# Patient Record
Sex: FEMALE | Race: WHITE | Employment: OTHER | URBAN - METROPOLITAN AREA
[De-identification: names, ages, dates, MRNs, and addresses within clinical notes are randomized per-mention and may not be internally consistent; named-entity substitution may affect disease eponyms.]

---

## 2018-10-24 RX ORDER — WATER FOR INJECTION,STERILE
VIAL (ML) INJECTION ONCE
Status: DISPENSED | OUTPATIENT
Start: 2018-10-29 | End: 2018-10-30

## 2018-10-29 ENCOUNTER — HOSPITAL ENCOUNTER (OUTPATIENT)
Dept: INFUSION THERAPY | Age: 73
Discharge: HOME | End: 2018-10-29

## 2019-04-04 ENCOUNTER — HOSPITAL ENCOUNTER (OUTPATIENT)
Dept: INFUSION THERAPY | Age: 74
Discharge: HOME | End: 2019-04-04
Payer: MEDICARE

## 2019-04-04 VITALS
TEMPERATURE: 97.8 F | OXYGEN SATURATION: 93 % | HEART RATE: 87 BPM | SYSTOLIC BLOOD PRESSURE: 103 MMHG | RESPIRATION RATE: 18 BRPM | DIASTOLIC BLOOD PRESSURE: 59 MMHG

## 2019-04-04 PROCEDURE — 96372 THER/PROPH/DIAG INJ SC/IM: CPT

## 2019-04-04 PROCEDURE — 74011250636 HC RX REV CODE- 250/636: Performed by: INTERNAL MEDICINE

## 2019-04-04 RX ORDER — LEVOTHYROXINE SODIUM 25 UG/1
50 TABLET ORAL
COMMUNITY

## 2019-04-04 RX ORDER — ACETAMINOPHEN 500 MG
500 TABLET ORAL
COMMUNITY

## 2019-04-04 RX ORDER — METOPROLOL SUCCINATE 25 MG/1
12.5 TABLET, EXTENDED RELEASE ORAL DAILY
COMMUNITY

## 2019-04-04 RX ORDER — OLMESARTAN MEDOXOMIL AND HYDROCHLOROTHIAZIDE 20/12.5 20; 12.5 MG/1; MG/1
1 TABLET ORAL DAILY
COMMUNITY

## 2019-04-04 RX ORDER — ASPIRIN 81 MG/1
81 TABLET ORAL DAILY
COMMUNITY

## 2019-04-04 RX ORDER — PREDNISONE 10 MG/1
10 TABLET ORAL AS NEEDED
COMMUNITY

## 2019-04-04 RX ORDER — MONTELUKAST SODIUM 10 MG/1
10 TABLET ORAL DAILY
COMMUNITY

## 2019-04-04 RX ORDER — ALBUTEROL SULFATE 0.83 MG/ML
2.5 SOLUTION RESPIRATORY (INHALATION) ONCE
COMMUNITY

## 2019-04-04 RX ORDER — FLUTICASONE FUROATE AND VILANTEROL 200; 25 UG/1; UG/1
1 POWDER RESPIRATORY (INHALATION) DAILY
COMMUNITY
End: 2019-06-06

## 2019-04-04 RX ORDER — HYDROCODONE BITARTRATE AND ACETAMINOPHEN 5; 325 MG/1; MG/1
1 TABLET ORAL AS NEEDED
COMMUNITY

## 2019-04-04 RX ADMIN — MEPOLIZUMAB 100 MG: 100 INJECTION, POWDER, FOR SOLUTION SUBCUTANEOUS at 13:32

## 2019-04-04 NOTE — PROGRESS NOTES
[facility] Progress Note Date: 2019 Name: Griselda Heather MRN: [id number] : 1945 Nucala Injection. Ms. Pilar Treadwell was assessed and education was provided. Ms. Ritter's vitals were reviewed and patient was observed for 5 minutes prior to treatment. Visit Vitals /59 (BP 1 Location: Right arm, BP Patient Position: Sitting) Pulse 87 Temp 97.8 °F (36.6 °C) Resp 18 SpO2 93% Breastfeeding? No  
 
 
 
Nucala 100 mg  was administered subcutaneously to the back of patient's left arm. Band aid applied to site. Nucala care notes given to patient. Full understanding noted. Opportunities for questions provided. Patient was observed for 30 minutes and found to have no allergic reaction. Ms. Pilar Treadwell tolerated well, and had no complaints. Patient armband removed and shredded. Ms. Pilar Treadwell was discharged from [place] [address] in stable condition at 1400. She is to return on 19 at 1330 for her next appointment. Rodney Kincaid 2019 
1:45 PM

## 2019-05-02 ENCOUNTER — HOSPITAL ENCOUNTER (OUTPATIENT)
Dept: INFUSION THERAPY | Age: 74
Discharge: HOME | End: 2019-05-02
Payer: MEDICARE

## 2019-05-02 VITALS
TEMPERATURE: 98 F | RESPIRATION RATE: 18 BRPM | SYSTOLIC BLOOD PRESSURE: 119 MMHG | OXYGEN SATURATION: 95 % | DIASTOLIC BLOOD PRESSURE: 70 MMHG | HEART RATE: 82 BPM

## 2019-05-02 PROCEDURE — 74011250636 HC RX REV CODE- 250/636: Performed by: INTERNAL MEDICINE

## 2019-05-02 PROCEDURE — 96372 THER/PROPH/DIAG INJ SC/IM: CPT

## 2019-05-02 RX ADMIN — MEPOLIZUMAB 100 MG: 100 INJECTION, POWDER, FOR SOLUTION SUBCUTANEOUS at 13:38

## 2019-05-02 NOTE — PROGRESS NOTES
[facility] Progress Note Date: May 2, 2019 Name: Marian Arrieta MRN: [id number] : 1945 Nucala 100 mg injection every 28 days Ms. Ritter was assessed and education was provided. Denies any problems with previous injection. Ms. Ritter's vitals were reviewed and patient was observed for 5 minutes prior to treatment. Visit Vitals /70 (BP 1 Location: Left arm, BP Patient Position: Sitting) Pulse 82 Temp 98 °F (36.7 °C) Resp 18 SpO2 95% Breastfeeding? No  
 
Patient Vitals for the past 12 hrs: 
 Temp Pulse Resp BP SpO2  
19 1410  82 18 119/70   
19 1329 98 °F (36.7 °C) 88 18 94/62 95 % Nucala 100 mg/1 ml was administered subcutaneously in back of upper left arm. No irritation or drainage noted from site, band aid applied. Observed for 20 minutes, no s/s reaction noted. . 
  
 
Ms. Ritter tolerated well, and had no complaints. Patient armband removed and shredded. Ms. Nivia Raza was discharged from [place] [address] in stable condition at 1410. She is to return on 2019 at 1400 for her next appointment for Nucala injeciton. Rolando Grady RN May 2, 2019 
3:04 PM

## 2019-06-06 ENCOUNTER — HOSPITAL ENCOUNTER (OUTPATIENT)
Dept: INFUSION THERAPY | Age: 74
Discharge: HOME | End: 2019-06-06
Payer: MEDICARE

## 2019-06-06 VITALS
TEMPERATURE: 98.4 F | HEART RATE: 88 BPM | RESPIRATION RATE: 18 BRPM | OXYGEN SATURATION: 97 % | SYSTOLIC BLOOD PRESSURE: 118 MMHG | DIASTOLIC BLOOD PRESSURE: 70 MMHG

## 2019-06-06 PROCEDURE — 96372 THER/PROPH/DIAG INJ SC/IM: CPT

## 2019-06-06 PROCEDURE — 74011250636 HC RX REV CODE- 250/636: Performed by: INTERNAL MEDICINE

## 2019-06-06 RX ADMIN — MEPOLIZUMAB 100 MG: 100 INJECTION, POWDER, FOR SOLUTION SUBCUTANEOUS at 14:27

## 2019-06-06 NOTE — PROGRESS NOTES
[facility] Progress Note    Date: 2019    Name: Rosario Dyer    MRN: [id number]         : 1945      Nucala 100 mg injection every 28 days      Ms. Ritter was assessed and education was provided. Denies any problems with previous injection. Ms. Ritter's vitals were reviewed and patient was observed for 5 minutes prior to treatment. Visit Vitals  /70 (BP 1 Location: Left arm, BP Patient Position: Sitting)   Pulse 88   Temp 98.4 °F (36.9 °C)   Resp 18   SpO2 97%   Breastfeeding? No     Patient Vitals for the past 12 hrs:   Temp Pulse Resp BP SpO2   19 1458 98.4 °F (36.9 °C) 88 18 118/70 97 %   19 1421 98.5 °F (36.9 °C) 88 18 116/68 98 %       Nucala 100 mg/1 ml was administered subcutaneously in back of upper left arm. No irritation or drainage noted from site, band aid applied. Observed for 30 minutes, no s/s reaction noted. .       Ms. Ritter tolerated well, and had no complaints. Patient armband removed and shredded. Ms. Jaky Ruano was discharged from [place] [address] in stable condition at 1500. She is to return on 2019 at 1400 for her next appointment for Nucala injeciton.     Darlene Stone RN  2019  3:04 PM

## 2019-07-09 ENCOUNTER — HOSPITAL ENCOUNTER (OUTPATIENT)
Dept: INFUSION THERAPY | Age: 74
Discharge: HOME | End: 2019-07-09
Payer: MEDICARE

## 2019-07-09 VITALS
OXYGEN SATURATION: 95 % | RESPIRATION RATE: 18 BRPM | DIASTOLIC BLOOD PRESSURE: 60 MMHG | SYSTOLIC BLOOD PRESSURE: 108 MMHG | TEMPERATURE: 98.3 F | HEART RATE: 80 BPM

## 2019-07-09 PROCEDURE — 74011250636 HC RX REV CODE- 250/636: Performed by: INTERNAL MEDICINE

## 2019-07-09 PROCEDURE — 96372 THER/PROPH/DIAG INJ SC/IM: CPT

## 2019-07-09 RX ADMIN — MEPOLIZUMAB 100 MG: 100 INJECTION, POWDER, FOR SOLUTION SUBCUTANEOUS at 14:53

## 2019-07-09 NOTE — PROGRESS NOTES
[facility] Progress Note    Date: 2019    Name: Edwar Mcfarlane    MRN: [id number]         : 1945      Ms. Ritter arrived to [facility] at 1425. Ms. Sandy Gonzalez was assessed and education was provided. Ms. Ritter's vitals were reviewed. Visit Vitals  /60 (BP 1 Location: Right arm, BP Patient Position: Sitting)   Pulse 80   Temp 98.3 °F (36.8 °C)   Resp 18   SpO2 95%   Breastfeeding? No       Nucala 100mg was administered as ordered SQ in patient's left upper arm. Pt stayed for 40 minutes after her injection for observation (per pt request). Ms. Sandy Gonzalez tolerated well without complaints. Ms. Sandy Gonzalez was discharged from [place] [address] in stable condition at 32 [phone] 16. She is to return on 19 at 1400 for her next appointment.     Tha Enamorado RN  2019

## 2019-08-06 ENCOUNTER — HOSPITAL ENCOUNTER (OUTPATIENT)
Dept: INFUSION THERAPY | Age: 74
Discharge: HOME | End: 2019-08-06
Payer: MEDICARE

## 2019-08-06 VITALS
DIASTOLIC BLOOD PRESSURE: 60 MMHG | TEMPERATURE: 98 F | SYSTOLIC BLOOD PRESSURE: 104 MMHG | OXYGEN SATURATION: 94 % | HEART RATE: 82 BPM | RESPIRATION RATE: 18 BRPM

## 2019-08-06 PROCEDURE — 74011250636 HC RX REV CODE- 250/636: Performed by: INTERNAL MEDICINE

## 2019-08-06 PROCEDURE — 96372 THER/PROPH/DIAG INJ SC/IM: CPT

## 2019-08-06 RX ADMIN — MEPOLIZUMAB 100 MG: 100 INJECTION, POWDER, FOR SOLUTION SUBCUTANEOUS at 14:00

## 2019-08-06 NOTE — PROGRESS NOTES
[facility] Progress Note    Date: 2019    Name: Tereza Hughes    MRN: [id number]         : 1945      Nucala 100 mg injection every 28 days      Ms. Ritter was assessed and education was provided. Denies any problems with previous injection. Ms. Ritter's vitals were reviewed and patient was observed for 5 minutes prior to treatment. Visit Vitals  /60 (BP 1 Location: Right arm, BP Patient Position: Sitting)   Pulse 82   Temp 98 °F (36.7 °C)   Resp 18   SpO2 94%   Breastfeeding? No     Patient Vitals for the past 12 hrs:   Temp Pulse Resp BP SpO2   19 1445 98 °F (36.7 °C) 82 18 104/60 94 %   19 1401 97.3 °F (36.3 °C) 87 18 118/72 94 %       Nucala 100 mg/1 ml was administered subcutaneously in back of upper left arm. No irritation or drainage noted from site, band aid applied. Observed for 30 minutes, no s/s reaction noted. .       Ms. Ritter tolerated well, and had no complaints. Patient armband removed and shredded. Ms. Marvin Martinez was discharged from [place] [address] in stable condition at [phone]. She is to return on 2019 at 1400 for her next appointment for Nucala injeciton.     Leidy Yeung RN  2019  3:04 PM

## 2019-09-12 ENCOUNTER — HOSPITAL ENCOUNTER (OUTPATIENT)
Dept: INFUSION THERAPY | Age: 74
Discharge: HOME | End: 2019-09-12
Payer: MEDICARE

## 2019-09-12 VITALS
TEMPERATURE: 98.4 F | OXYGEN SATURATION: 95 % | DIASTOLIC BLOOD PRESSURE: 70 MMHG | HEART RATE: 82 BPM | SYSTOLIC BLOOD PRESSURE: 127 MMHG | RESPIRATION RATE: 18 BRPM

## 2019-09-12 PROCEDURE — 74011250636 HC RX REV CODE- 250/636: Performed by: INTERNAL MEDICINE

## 2019-09-12 PROCEDURE — 96372 THER/PROPH/DIAG INJ SC/IM: CPT

## 2019-09-12 RX ORDER — AA/PROT/LYSINE/METHIO/VIT C/B6 50-12.5 MG
TABLET ORAL DAILY
COMMUNITY

## 2019-09-12 RX ORDER — ATORVASTATIN CALCIUM 40 MG/1
40 TABLET, FILM COATED ORAL DAILY
COMMUNITY

## 2019-09-12 RX ORDER — NITROGLYCERIN 0.4 MG/1
0.4 TABLET SUBLINGUAL
COMMUNITY

## 2019-09-12 RX ADMIN — MEPOLIZUMAB 100 MG: 100 INJECTION, POWDER, FOR SOLUTION SUBCUTANEOUS at 14:15

## 2019-09-12 NOTE — PROGRESS NOTES
[facility] Short Consult Note                  (Labs/Type & Cross/Portflush/Antibiotic/Non-Chemo injections)      Date: 2019    Name: Najma Montague    MRN: [id number]         : 1945    Treatment: Anayeli Bacca Injection      Ms. Ritter was assessed and education was provided. Ms. Dejuan Cuba stated she has had a heart attack since last visit on 8/10/19 and had cardiac stents placed at [facility]. Ms. Ritter's vitals were reviewed and patient was observed for 5 minutes prior to treatment. Visit Vitals  /70 (BP 1 Location: Left arm, BP Patient Position: Sitting)   Pulse 82   Temp 98.4 °F (36.9 °C)   Resp 18   SpO2 95%   Breastfeeding? No     Nucala 100 mg SQ given in patient's Left upper arm and band aid applied to site. Ms. Dejuan Cuba tolerated the injection, and had no complaints. Ms. Dejuan Cuba was discharged from [place] [address] in stable condition at 1450. She is to return on 10/10/19 at 1400 for her next appointment. Armband removed and shredded.      Kelly Crooks RN  2019  2:23 PM

## 2019-10-08 ENCOUNTER — APPOINTMENT (OUTPATIENT)
Dept: INFUSION THERAPY | Age: 74
End: 2019-10-08
Payer: MEDICARE

## 2019-10-10 ENCOUNTER — HOSPITAL ENCOUNTER (OUTPATIENT)
Dept: INFUSION THERAPY | Age: 74
Discharge: HOME | End: 2019-10-10
Payer: MEDICARE

## 2019-10-10 VITALS
DIASTOLIC BLOOD PRESSURE: 62 MMHG | HEART RATE: 84 BPM | RESPIRATION RATE: 18 BRPM | TEMPERATURE: 98.3 F | OXYGEN SATURATION: 96 % | SYSTOLIC BLOOD PRESSURE: 105 MMHG

## 2019-10-10 PROCEDURE — 74011250636 HC RX REV CODE- 250/636: Performed by: INTERNAL MEDICINE

## 2019-10-10 PROCEDURE — 96372 THER/PROPH/DIAG INJ SC/IM: CPT

## 2019-10-10 RX ADMIN — MEPOLIZUMAB 100 MG: 100 INJECTION, POWDER, FOR SOLUTION SUBCUTANEOUS at 14:44

## 2019-10-10 NOTE — PROGRESS NOTES
[facility] Short Consult Note                                              (Non-Chemo injections)      Date: October 10, 2019    Name: Carlon Olszewski    MRN: [id number]         : 1945    Treatment: Masha Katos Injection      Ms. Ritter arrived ambulatory to [facility] by self @ 5812. Ms. Ritter's vitals were reviewed and patient was observed for 5 minutes prior to treatment. Visit Vitals  /62 (BP 1 Location: Left arm, BP Patient Position: Sitting)   Pulse 84   Temp 98.3 °F (36.8 °C)   Resp 18   SpO2 96%   Breastfeeding? No     Nucala 100 mg SQ given in patient's Left upper arm and band aid applied to site. Ms. Maura Avila tolerated the injection, and had no complaints. Ms. Maura Avila was discharged from [place] [address] in stable condition at 1500. She is to return on 19 at 1400 for her next appointment. Armband removed and shredded.      Sussy Saucedo RN  October 10, 2019  2:23 PM

## 2019-10-30 PROBLEM — J82.83 EOSINOPHILIC ASTHMA: Status: ACTIVE | Noted: 2019-10-30

## 2019-11-07 DIAGNOSIS — J82.83 EOSINOPHILIC ASTHMA: ICD-10-CM

## 2019-11-14 ENCOUNTER — HOSPITAL ENCOUNTER (OUTPATIENT)
Dept: INFUSION THERAPY | Age: 74
Discharge: HOME | End: 2019-11-14
Attending: INTERNAL MEDICINE
Payer: MEDICARE

## 2019-11-14 VITALS
SYSTOLIC BLOOD PRESSURE: 112 MMHG | HEART RATE: 76 BPM | TEMPERATURE: 97 F | RESPIRATION RATE: 18 BRPM | DIASTOLIC BLOOD PRESSURE: 73 MMHG | OXYGEN SATURATION: 99 %

## 2019-11-14 DIAGNOSIS — J82.83 EOSINOPHILIC ASTHMA: ICD-10-CM

## 2019-11-14 DIAGNOSIS — J82.83 EOSINOPHILIC ASTHMA: Primary | ICD-10-CM

## 2019-11-14 PROCEDURE — 74011250636 HC RX REV CODE- 250/636: Performed by: INTERNAL MEDICINE

## 2019-11-14 PROCEDURE — 96372 THER/PROPH/DIAG INJ SC/IM: CPT

## 2019-11-14 RX ADMIN — MEPOLIZUMAB 100 MG: 100 INJECTION, POWDER, FOR SOLUTION SUBCUTANEOUS at 14:15

## 2019-11-14 NOTE — PROGRESS NOTES
[facility] Short Consult Note                  (Labs/Type & Cross/Portflush/Antibiotic/Non-Chemo injections)      Date: 2019    Name: Nunu Hernandez    MRN: [id number]         : 1945    Treatment: Aldean Meuse Injection      Ms. Ritter was assessed and education was provided. Ms. Ritter's vitals were reviewed and patient was observed for 5 minutes prior to treatment. Visit Vitals  /73 (BP 1 Location: Left arm, BP Patient Position: Sitting)   Pulse 76   Temp 97 °F (36.1 °C)   Resp 18   SpO2 99%   Breastfeeding? No       Nucala 100 mg SQ given in patient's Left upper arm and band aid applied to site.     Ms. Ritter tolerated the injection, and had no complaints. Patient prefers to wait in clinic for observation post injection. Patient observed for approximately 45 minutes post injection. No s/s of complications noted. Ms. Oconnell Seen was discharged from [place] [address] in stable condition at 1500. She is to return on 19 at 1400 for her next appointment.     Rozina Moody RN  2019  2:38 PM

## 2019-12-05 DIAGNOSIS — J82.83 EOSINOPHILIC ASTHMA: ICD-10-CM

## 2019-12-12 ENCOUNTER — HOSPITAL ENCOUNTER (OUTPATIENT)
Dept: INFUSION THERAPY | Age: 74
Discharge: HOME | End: 2019-12-12
Attending: INTERNAL MEDICINE
Payer: MEDICARE

## 2019-12-12 VITALS
TEMPERATURE: 97.6 F | SYSTOLIC BLOOD PRESSURE: 108 MMHG | OXYGEN SATURATION: 97 % | HEART RATE: 71 BPM | RESPIRATION RATE: 16 BRPM | DIASTOLIC BLOOD PRESSURE: 63 MMHG

## 2019-12-12 DIAGNOSIS — J82.83 EOSINOPHILIC ASTHMA: Primary | ICD-10-CM

## 2019-12-12 PROCEDURE — 74011250636 HC RX REV CODE- 250/636: Performed by: INTERNAL MEDICINE

## 2019-12-12 PROCEDURE — 96372 THER/PROPH/DIAG INJ SC/IM: CPT

## 2019-12-12 RX ADMIN — MEPOLIZUMAB 100 MG: 100 INJECTION, POWDER, FOR SOLUTION SUBCUTANEOUS at 14:30

## 2019-12-12 NOTE — PROGRESS NOTES
[facility] Short Consult Note                  (Labs/Type & Cross/Portflush/Antibiotic/Non-Chemo injections)      Date: 2019    Name: Irwin Galaviz    MRN: [id number]         : 1945    Treatment: Louana Ano Injection      Ms. Ritter was assessed and education was provided. Ms. Ritter's vitals were reviewed and patient was observed for 5 minutes prior to treatment. Visit Vitals  /63 (BP 1 Location: Left arm, BP Patient Position: Sitting)   Pulse 71   Temp 97.6 °F (36.4 °C)   Resp 16   SpO2 97%       Nucala 100 mg SQ given in patient's Left upper arm and band aid applied to site.     Ms. Ritter tolerated the injection, and had no complaints. Patient prefers to wait in clinic for observation post injection. Patient observed for approximately 45 minutes post injection. No s/s of complications noted. Ms. Shira Villanueva was discharged from [place] [address] in stable condition at 1515. She is to return on 2020 at 1400 for her next appointment.     Rocael Corcoran RN  2019  2:38 PM

## 2020-01-02 DIAGNOSIS — J82.83 EOSINOPHILIC ASTHMA: ICD-10-CM

## 2020-01-09 ENCOUNTER — HOSPITAL ENCOUNTER (OUTPATIENT)
Dept: INFUSION THERAPY | Age: 75
Discharge: HOME | End: 2020-01-09
Attending: INTERNAL MEDICINE
Payer: MEDICARE

## 2020-01-09 VITALS
DIASTOLIC BLOOD PRESSURE: 62 MMHG | OXYGEN SATURATION: 96 % | RESPIRATION RATE: 16 BRPM | SYSTOLIC BLOOD PRESSURE: 115 MMHG | TEMPERATURE: 99 F | HEART RATE: 78 BPM

## 2020-01-09 DIAGNOSIS — J82.83 EOSINOPHILIC ASTHMA: Primary | ICD-10-CM

## 2020-01-09 PROCEDURE — 96372 THER/PROPH/DIAG INJ SC/IM: CPT

## 2020-01-09 PROCEDURE — 74011250636 HC RX REV CODE- 250/636: Performed by: INTERNAL MEDICINE

## 2020-01-09 RX ADMIN — MEPOLIZUMAB 100 MG: 100 INJECTION, POWDER, FOR SOLUTION SUBCUTANEOUS at 14:32

## 2020-01-09 NOTE — PROGRESS NOTES
[facility] Short Consult Note                  (Labs/Type & Cross/Portflush/Antibiotic/Non-Chemo injections)      Date: 2020    Name: Mesha Alcaraz    MRN: [id number]         : 1945    Treatment: Keya Ivanoff Injection      Ms. Ritter was assessed and education was provided. Ms. Ritter's vitals were reviewed and patient was observed for 5 minutes prior to treatment. Visit Vitals  /62 (BP 1 Location: Left arm, BP Patient Position: Sitting)   Pulse 78   Temp 99 °F (37.2 °C)   Resp 16   SpO2 96%       Nucala 100 mg SQ given in patient's Left upper arm and band aid applied to site.     Ms. Ritter tolerated the injection, and had no complaints. Patient prefers to wait in clinic for observation post injection. Patient observed for approximately 45 minutes post injection. No s/s of complications noted. Ms. Yessy Romero was discharged from [place] [address] in stable condition at 1515. She is to return on 2020 at 1400 for her next appointment.     [facility], RN  2020  2:38 PM

## 2020-01-30 DIAGNOSIS — J82.83 EOSINOPHILIC ASTHMA: ICD-10-CM

## 2020-02-10 ENCOUNTER — HOSPITAL ENCOUNTER (OUTPATIENT)
Dept: INFUSION THERAPY | Age: 75
Discharge: HOME | End: 2020-02-10
Attending: INTERNAL MEDICINE
Payer: MEDICARE

## 2020-02-10 VITALS
TEMPERATURE: 98 F | DIASTOLIC BLOOD PRESSURE: 69 MMHG | OXYGEN SATURATION: 97 % | HEART RATE: 76 BPM | SYSTOLIC BLOOD PRESSURE: 116 MMHG | RESPIRATION RATE: 16 BRPM

## 2020-02-10 DIAGNOSIS — J82.83 EOSINOPHILIC ASTHMA: Primary | ICD-10-CM

## 2020-02-10 PROCEDURE — 74011250636 HC RX REV CODE- 250/636: Performed by: INTERNAL MEDICINE

## 2020-02-10 PROCEDURE — 96372 THER/PROPH/DIAG INJ SC/IM: CPT

## 2020-02-10 RX ADMIN — MEPOLIZUMAB 100 MG: 100 INJECTION, POWDER, FOR SOLUTION SUBCUTANEOUS at 14:27

## 2020-02-10 NOTE — PROGRESS NOTES
[facility] Short Consult Note                  (Labs/Type & Cross/Portflush/Antibiotic/Non-Chemo injections)      Date: February 10, 2020    Name: Ar Morataya    MRN: [id number]         : 1945    Treatment: [place] Injection      Ms. Ritter was assessed and education was provided. Ms. Ritter's vitals were reviewed and patient was observed for 5 minutes prior to treatment. Visit Vitals  /69 (BP 1 Location: Left arm, BP Patient Position: Sitting)   Pulse 76   Temp 98 °F (36.7 °C)   Resp 16   SpO2 97%       Nucala 100 mg SQ given in patient's Left upper arm and band aid applied to site.     Ms. Ritter tolerated the injection, and had no complaints. Patient prefers to wait in clinic for observation post injection. Patient observed for approximately 45 minutes post injection. No s/s of complications noted. Ms. Lillian Mercer was discharged from [place] [address] in stable condition at 1520. She is to return on 2020 at 1400 for her next appointment.     Carmen Vogel RN  February 10, 2020  2:38 PM

## 2020-02-27 DIAGNOSIS — J82.83 EOSINOPHILIC ASTHMA: ICD-10-CM

## 2020-03-05 ENCOUNTER — APPOINTMENT (OUTPATIENT)
Dept: INFUSION THERAPY | Age: 75
End: 2020-03-05
Attending: INTERNAL MEDICINE
Payer: MEDICARE

## 2020-03-09 ENCOUNTER — HOSPITAL ENCOUNTER (OUTPATIENT)
Dept: INFUSION THERAPY | Age: 75
Discharge: HOME | End: 2020-03-09
Attending: INTERNAL MEDICINE
Payer: MEDICARE

## 2020-03-09 VITALS
HEART RATE: 74 BPM | DIASTOLIC BLOOD PRESSURE: 68 MMHG | SYSTOLIC BLOOD PRESSURE: 113 MMHG | TEMPERATURE: 98.8 F | OXYGEN SATURATION: 96 %

## 2020-03-09 DIAGNOSIS — J82.83 EOSINOPHILIC ASTHMA: ICD-10-CM

## 2020-03-09 DIAGNOSIS — J82.83 EOSINOPHILIC ASTHMA: Primary | ICD-10-CM

## 2020-03-09 PROCEDURE — 74011250636 HC RX REV CODE- 250/636: Performed by: INTERNAL MEDICINE

## 2020-03-09 PROCEDURE — 96372 THER/PROPH/DIAG INJ SC/IM: CPT

## 2020-03-09 RX ADMIN — MEPOLIZUMAB 100 MG: 100 INJECTION, POWDER, FOR SOLUTION SUBCUTANEOUS at 14:16

## 2020-03-09 NOTE — PROGRESS NOTES
[facility] Progress Note    Date: 2020    Name: Escobar Mcdowell    MRN: [id number]         : 1945      Nucala 100 mg injection every 28 days      Ms. Ritter was assessed and education was provided. Denies any problems with previous injection. Ms. Ritter's vitals were reviewed and patient was observed for 5 minutes prior to treatment. Visit Vitals  /68 (BP 1 Location: Left arm, BP Patient Position: Sitting)   Pulse 74   Temp 98.8 °F (37.1 °C)   SpO2 96%     Pt verbalized concern that \" the blood pressure cuff was so tight, no one ever takes my pressure there ( upper arm above the antecubital ). Reassurance provided to patient that this is a standard location for blood pressures and offered to retake it lower. Pt states ' what if I get a blood clot from the squeezing and have a stroke. \" \" what signs should I look for. \"   Assess patients arm no discoloration, warmth, present. Advised patient if there is a discoloration ( red, black or darken,) warmth at site, pain at site to go to ED immediately. Verbalized understanding. Nucala 100 mg/1 ml was administered subcutaneously in back of upper left arm. No irritation or drainage noted from site, band aid applied. Observed for 30 minutes, no s/s reaction noted. .       Patient armband removed and shredded. Ms. Elizabeth Corona was discharged from [place] [address] in stable condition at [phone] 90. She is to return on 2020 at 1400 for her next appointment for Nucala injeciton.     Kary Swartz RN  2020

## 2020-04-06 ENCOUNTER — HOSPITAL ENCOUNTER (OUTPATIENT)
Dept: INFUSION THERAPY | Age: 75
Discharge: HOME | End: 2020-04-06
Attending: INTERNAL MEDICINE
Payer: MEDICARE

## 2020-04-06 VITALS
TEMPERATURE: 96.9 F | HEART RATE: 72 BPM | OXYGEN SATURATION: 98 % | SYSTOLIC BLOOD PRESSURE: 125 MMHG | RESPIRATION RATE: 16 BRPM | DIASTOLIC BLOOD PRESSURE: 74 MMHG

## 2020-04-06 DIAGNOSIS — J82.83 EOSINOPHILIC ASTHMA: ICD-10-CM

## 2020-04-06 DIAGNOSIS — J82.83 EOSINOPHILIC ASTHMA: Primary | ICD-10-CM

## 2020-04-06 PROCEDURE — 96372 THER/PROPH/DIAG INJ SC/IM: CPT

## 2020-04-06 PROCEDURE — 74011250636 HC RX REV CODE- 250/636: Performed by: INTERNAL MEDICINE

## 2020-04-06 RX ADMIN — MEPOLIZUMAB 100 MG: 100 INJECTION, POWDER, FOR SOLUTION SUBCUTANEOUS at 14:16

## 2020-04-06 NOTE — PROGRESS NOTES
[facility] Progress Note    Date: 2020    Name: Bel Sy    MRN: [id number]         : 1945      Nucala 100 mg injection every 28 days      Ms. Ritter was assessed and education was provided. Denies any problems with previous injection. Ms. Ritter's vitals were reviewed and patient was observed for 5 minutes prior to treatment. Visit Vitals  /74 (BP 1 Location: Right arm, BP Patient Position: Sitting)   Pulse 72   Temp 96.9 °F (36.1 °C)   Resp 16   SpO2 98%   Breastfeeding No     Patient Vitals for the past 24 hrs:   Temp Pulse Resp BP SpO2   20 1425  72  125/74    20 1411 96.9 °F (36.1 °C) 75 16 136/81 98 %       Nucala 100 mg/1 ml was administered subcutaneously in back of upper left arm. No irritation or drainage noted from site, band aid applied. Observed for 10 minutes, no s/s reaction noted. Patient armband removed and shredded. Ms. Isabell Max was discharged from [place] [address] in stable condition at 1435. She is to return on 2020 at 1400 for her next appointment for Nucala injeciton.     Milad Davis RN  2020

## 2020-05-04 ENCOUNTER — HOSPITAL ENCOUNTER (OUTPATIENT)
Dept: INFUSION THERAPY | Age: 75
Discharge: HOME | End: 2020-05-04
Attending: INTERNAL MEDICINE
Payer: MEDICARE

## 2020-05-04 VITALS
RESPIRATION RATE: 16 BRPM | DIASTOLIC BLOOD PRESSURE: 72 MMHG | HEART RATE: 91 BPM | SYSTOLIC BLOOD PRESSURE: 133 MMHG | TEMPERATURE: 97.5 F | OXYGEN SATURATION: 96 %

## 2020-05-04 DIAGNOSIS — J82.83 EOSINOPHILIC ASTHMA: ICD-10-CM

## 2020-05-04 DIAGNOSIS — J82.83 EOSINOPHILIC ASTHMA: Primary | ICD-10-CM

## 2020-05-04 PROCEDURE — 96372 THER/PROPH/DIAG INJ SC/IM: CPT

## 2020-05-04 PROCEDURE — 74011250636 HC RX REV CODE- 250/636: Performed by: INTERNAL MEDICINE

## 2020-05-04 RX ADMIN — MEPOLIZUMAB 100 MG: 100 INJECTION, POWDER, FOR SOLUTION SUBCUTANEOUS at 14:15

## 2020-05-04 NOTE — PROGRESS NOTES
[facility] Progress Note    Date: May 4, 2020    Name: Nunu Hernandez    MRN: [id number]         : 1945      Nucala 100 mg injection every 28 days      Ms. Ritter was assessed and education was provided. Denies any problems with previous injection. Ms. Ritter's vitals were reviewed and patient was observed for 5 minutes prior to treatment. Visit Vitals  /72 (BP 1 Location: Left arm, BP Patient Position: Sitting)   Pulse 91   Temp 97.5 °F (36.4 °C)   Resp 16   SpO2 96%   Breastfeeding No     Patient Vitals for the past 24 hrs:   Temp Pulse Resp BP SpO2   20 1408 97.5 °F (36.4 °C) 91 16 133/72 96 %       Nucala 100 mg/1 ml was administered subcutaneously in back of upper left arm. No irritation or drainage noted from site, band aid applied. Observed for 20 minutes, no s/s reaction noted. Patient armband removed and shredded. Ms. Oconnell Seen was discharged from [place] [address] in stable condition at [phone] 14 90. She is to return on 2020 at 1400 for her next appointment for Nucala injeciton.     Ernestina Velez RN  May 4, 2020

## 2020-06-01 ENCOUNTER — HOSPITAL ENCOUNTER (OUTPATIENT)
Dept: INFUSION THERAPY | Age: 75
Discharge: HOME | End: 2020-06-01
Attending: INTERNAL MEDICINE
Payer: MEDICARE

## 2020-06-01 VITALS
DIASTOLIC BLOOD PRESSURE: 74 MMHG | RESPIRATION RATE: 16 BRPM | TEMPERATURE: 97.6 F | HEART RATE: 73 BPM | OXYGEN SATURATION: 96 % | SYSTOLIC BLOOD PRESSURE: 121 MMHG

## 2020-06-01 DIAGNOSIS — J82.83 EOSINOPHILIC ASTHMA: Primary | ICD-10-CM

## 2020-06-01 DIAGNOSIS — J82.83 EOSINOPHILIC ASTHMA: ICD-10-CM

## 2020-06-01 PROCEDURE — 74011250636 HC RX REV CODE- 250/636: Performed by: INTERNAL MEDICINE

## 2020-06-01 PROCEDURE — 96372 THER/PROPH/DIAG INJ SC/IM: CPT

## 2020-06-01 RX ADMIN — MEPOLIZUMAB 100 MG: 100 INJECTION, POWDER, FOR SOLUTION SUBCUTANEOUS at 14:17

## 2020-06-01 NOTE — PROGRESS NOTES
[facility] Progress Note    Date: 2020    Name: Savita Alvarez    MRN: [id number]         : 1945      Nucala 100 mg injection every 28 days      Ms. Ritter was assessed and education was provided. Denies any problems with previous injection. Ms. Ritter's vitals were reviewed and patient was observed for 5 minutes prior to treatment. Visit Vitals  /74 (BP 1 Location: Left arm, BP Patient Position: Sitting)   Pulse 73   Temp 97.6 °F (36.4 °C)   Resp 16   SpO2 96%   Breastfeeding No     Patient Vitals for the past 24 hrs:   Temp Pulse Resp BP SpO2   20 1406 97.6 °F (36.4 °C) 73 16 121/74    20 1400     96 %       Nucala 100 mg/1 ml was administered subcutaneously in back of upper left arm. No irritation or drainage noted from site, band aid applied. Observed for approximately 15 minutes, no s/s reaction noted. Patient armband removed and shredded. Ms. Celi Kapoor was discharged from [place] [address] in stable condition at 1430. She is to return on 2020 at 1400 for her next appointment for Nucala injeciton.     José Antonio Calles RN  2020

## 2020-06-29 ENCOUNTER — HOSPITAL ENCOUNTER (OUTPATIENT)
Dept: INFUSION THERAPY | Age: 75
Discharge: HOME | End: 2020-06-29
Attending: INTERNAL MEDICINE
Payer: MEDICARE

## 2020-06-29 VITALS
RESPIRATION RATE: 16 BRPM | SYSTOLIC BLOOD PRESSURE: 111 MMHG | TEMPERATURE: 97.6 F | HEART RATE: 84 BPM | OXYGEN SATURATION: 97 % | DIASTOLIC BLOOD PRESSURE: 73 MMHG

## 2020-06-29 DIAGNOSIS — J82.83 EOSINOPHILIC ASTHMA: Primary | ICD-10-CM

## 2020-06-29 DIAGNOSIS — J82.83 EOSINOPHILIC ASTHMA: ICD-10-CM

## 2020-06-29 PROCEDURE — 74011250636 HC RX REV CODE- 250/636: Performed by: INTERNAL MEDICINE

## 2020-06-29 PROCEDURE — 96372 THER/PROPH/DIAG INJ SC/IM: CPT

## 2020-06-29 RX ADMIN — MEPOLIZUMAB 100 MG: 100 INJECTION, POWDER, FOR SOLUTION SUBCUTANEOUS at 14:22

## 2020-07-27 DIAGNOSIS — J82.83 EOSINOPHILIC ASTHMA: ICD-10-CM

## 2020-07-31 ENCOUNTER — HOSPITAL ENCOUNTER (OUTPATIENT)
Dept: INFUSION THERAPY | Age: 75
Discharge: HOME | End: 2020-07-31
Attending: INTERNAL MEDICINE
Payer: MEDICARE

## 2020-07-31 VITALS
OXYGEN SATURATION: 97 % | DIASTOLIC BLOOD PRESSURE: 66 MMHG | RESPIRATION RATE: 16 BRPM | HEART RATE: 77 BPM | SYSTOLIC BLOOD PRESSURE: 123 MMHG | TEMPERATURE: 96.8 F

## 2020-07-31 DIAGNOSIS — J82.83 EOSINOPHILIC ASTHMA: Primary | ICD-10-CM

## 2020-07-31 PROCEDURE — 74011250636 HC RX REV CODE- 250/636: Performed by: INTERNAL MEDICINE

## 2020-07-31 PROCEDURE — 96372 THER/PROPH/DIAG INJ SC/IM: CPT

## 2020-07-31 RX ADMIN — MEPOLIZUMAB 100 MG: 100 INJECTION, POWDER, FOR SOLUTION SUBCUTANEOUS at 14:30

## 2020-07-31 NOTE — PROGRESS NOTES
[facility] Progress Note    Date: 2020    Name: Kaitlynn Uribe    MRN: [id number]         : 1945      Nucala 100 mg injection every 28 days      Ms. Ritter was assessed and education was provided. Denies any problems with previous injection. Ms. Ritter's vitals were reviewed and patient was observed for 5 minutes prior to treatment. Visit Vitals  /66 (BP 1 Location: Left arm, BP Patient Position: Sitting)   Pulse 77   Temp 96.8 °F (36 °C)   Resp 16   SpO2 97%   Breastfeeding No         Nucala 100 mg/1 ml was administered subcutaneously in back of upper left arm. No irritation or drainage noted from site, band aid applied. Observed for approximately 15 minutes, no s/s reaction noted. Patient armband removed and shredded. Ms. Jolie Campo was discharged from [place] [address] in stable condition at [phone] 14 90. She is to return on 2020 at 1400 for her next appointment for Nucala injeciton.     Teofilo Real RN  2020

## 2020-08-24 ENCOUNTER — HOSPITAL ENCOUNTER (OUTPATIENT)
Dept: INFUSION THERAPY | Age: 75
Discharge: HOME | End: 2020-08-24
Attending: INTERNAL MEDICINE
Payer: MEDICARE

## 2020-08-24 VITALS
RESPIRATION RATE: 16 BRPM | SYSTOLIC BLOOD PRESSURE: 120 MMHG | HEART RATE: 86 BPM | DIASTOLIC BLOOD PRESSURE: 82 MMHG | OXYGEN SATURATION: 98 % | TEMPERATURE: 97.8 F

## 2020-08-24 DIAGNOSIS — J82.83 EOSINOPHILIC ASTHMA: Primary | ICD-10-CM

## 2020-08-24 DIAGNOSIS — J82.83 EOSINOPHILIC ASTHMA: ICD-10-CM

## 2020-08-24 PROCEDURE — 74011250636 HC RX REV CODE- 250/636: Performed by: INTERNAL MEDICINE

## 2020-08-24 PROCEDURE — 96372 THER/PROPH/DIAG INJ SC/IM: CPT

## 2020-08-24 RX ADMIN — MEPOLIZUMAB 100 MG: 100 INJECTION, POWDER, FOR SOLUTION SUBCUTANEOUS at 14:12

## 2020-09-21 ENCOUNTER — HOSPITAL ENCOUNTER (OUTPATIENT)
Dept: INFUSION THERAPY | Age: 75
End: 2020-09-21
Attending: INTERNAL MEDICINE
Payer: MEDICARE

## 2020-09-21 DIAGNOSIS — J82.83 EOSINOPHILIC ASTHMA: ICD-10-CM

## 2020-09-28 ENCOUNTER — HOSPITAL ENCOUNTER (OUTPATIENT)
Dept: INFUSION THERAPY | Age: 75
Discharge: HOME | End: 2020-09-28
Attending: INTERNAL MEDICINE
Payer: MEDICARE

## 2020-09-28 VITALS
OXYGEN SATURATION: 98 % | HEART RATE: 75 BPM | RESPIRATION RATE: 16 BRPM | DIASTOLIC BLOOD PRESSURE: 73 MMHG | SYSTOLIC BLOOD PRESSURE: 128 MMHG | TEMPERATURE: 97.5 F

## 2020-09-28 DIAGNOSIS — J82.83 EOSINOPHILIC ASTHMA: Primary | ICD-10-CM

## 2020-09-28 PROCEDURE — 74011250636 HC RX REV CODE- 250/636: Performed by: INTERNAL MEDICINE

## 2020-09-28 PROCEDURE — 96372 THER/PROPH/DIAG INJ SC/IM: CPT

## 2020-09-28 RX ADMIN — MEPOLIZUMAB 100 MG: 100 INJECTION, POWDER, FOR SOLUTION SUBCUTANEOUS at 14:09

## 2020-09-28 NOTE — PROGRESS NOTES
[facility] Progress Note    Date: 2020    Name: Garret Cooper    MRN: [id number]         : 1945      Nucala 100 mg injection every 28 days      Ms. Ritter was assessed and education was provided. Denies any problems with previous injection. Ms. Ritter's vitals were reviewed and patient was observed for 5 minutes prior to treatment. Visit Vitals  /73 (BP 1 Location: Left arm, BP Patient Position: Sitting)   Pulse 75   Temp 97.5 °F (36.4 °C)   Resp 16   SpO2 98%   Breastfeeding No         Nucala 100 mg/1 ml was administered subcutaneously in back of upper left arm. No irritation or drainage noted from site, band aid applied. Observed for approximately 15 minutes, no s/s reaction noted. Patient armband removed and shredded. Ms. Delano Segal was discharged from [place] [address] in stable condition at 1430. She is to return on 10/26/2020 at 1400 for her next appointment for Nucala injeciton.     Lionel Ballesteros RN  2020

## 2020-10-19 DIAGNOSIS — J82.83 EOSINOPHILIC ASTHMA: ICD-10-CM

## 2020-10-26 ENCOUNTER — HOSPITAL ENCOUNTER (OUTPATIENT)
Dept: INFUSION THERAPY | Age: 75
Discharge: HOME | End: 2020-10-26
Attending: INTERNAL MEDICINE
Payer: MEDICARE

## 2020-10-26 VITALS
HEART RATE: 79 BPM | TEMPERATURE: 96.9 F | RESPIRATION RATE: 16 BRPM | DIASTOLIC BLOOD PRESSURE: 82 MMHG | OXYGEN SATURATION: 98 % | SYSTOLIC BLOOD PRESSURE: 128 MMHG

## 2020-10-26 DIAGNOSIS — J82.83 EOSINOPHILIC ASTHMA: Primary | ICD-10-CM

## 2020-10-26 PROCEDURE — 96372 THER/PROPH/DIAG INJ SC/IM: CPT

## 2020-10-26 PROCEDURE — 74011250636 HC RX REV CODE- 250/636: Performed by: INTERNAL MEDICINE

## 2020-10-26 RX ADMIN — MEPOLIZUMAB 100 MG: 100 INJECTION, POWDER, FOR SOLUTION SUBCUTANEOUS at 14:15

## 2020-10-26 NOTE — PROGRESS NOTES
[facility] Progress Note    Date: 2020    Name: Holly Milner    MRN: [id number]         : 1945      Nucala 100 mg injection every 28 days      Ms. Ritter was assessed and education was provided. Denies any problems with previous injection. Ms. Ritter's vitals were reviewed and patient was observed for 5 minutes prior to treatment. Visit Vitals  /82 (BP 1 Location: Left arm, BP Patient Position: Sitting)   Pulse 79   Temp 96.9 °F (36.1 °C)   Resp 16   SpO2 98%   Breastfeeding No     Took patients blood pressure manually, because she was concern about the elevation in the automatic. Nucala 100 mg/1 ml was administered subcutaneously in back of upper left arm. No irritation or drainage noted from site, band aid applied. Observed for 30 minutes, no s/s reaction noted. .       Ms. Ritter tolerated well, and had no complaints. Patient armband removed and shredded. Ms. Carlos A Madera was discharged from [place] [address] in stable condition at 1430. She is to return on 2020 at 1400 for her next appointment for Nucala injeciton.     Eric Washington RN  2020  3:04 PM

## 2020-11-16 DIAGNOSIS — J82.83 EOSINOPHILIC ASTHMA: ICD-10-CM

## 2020-11-23 ENCOUNTER — HOSPITAL ENCOUNTER (OUTPATIENT)
Dept: INFUSION THERAPY | Age: 75
End: 2020-11-23
Attending: INTERNAL MEDICINE

## 2020-11-30 ENCOUNTER — HOSPITAL ENCOUNTER (OUTPATIENT)
Dept: INFUSION THERAPY | Age: 75
Discharge: HOME | End: 2020-11-30
Attending: INTERNAL MEDICINE
Payer: MEDICARE

## 2020-11-30 VITALS
DIASTOLIC BLOOD PRESSURE: 72 MMHG | OXYGEN SATURATION: 97 % | TEMPERATURE: 96.9 F | HEART RATE: 73 BPM | SYSTOLIC BLOOD PRESSURE: 128 MMHG | RESPIRATION RATE: 16 BRPM

## 2020-11-30 DIAGNOSIS — J82.83 EOSINOPHILIC ASTHMA: Primary | ICD-10-CM

## 2020-11-30 PROCEDURE — 74011250636 HC RX REV CODE- 250/636: Performed by: INTERNAL MEDICINE

## 2020-11-30 PROCEDURE — 96372 THER/PROPH/DIAG INJ SC/IM: CPT

## 2020-11-30 RX ADMIN — MEPOLIZUMAB 100 MG: 100 INJECTION, POWDER, FOR SOLUTION SUBCUTANEOUS at 14:30

## 2020-11-30 NOTE — PROGRESS NOTES
[facility] Progress Note    Date: 2020    Name: Karli Lazar    MRN: [id number]         : 1945      Nucala 100 mg injection every 28 days      Ms. Ritter was assessed and education was provided. Denies any problems with previous injection. Ms. Ritter's vitals were reviewed and patient was observed for 5 minutes prior to treatment. Visit Vitals  /72 (BP 1 Location: Left arm, BP Patient Position: Sitting)   Pulse 73   Temp 96.9 °F (36.1 °C)   Resp 16   SpO2 97%   Breastfeeding No     Took patients blood pressure manually, because she was concern about the elevation in the automatic. Nucala 100 mg/1 ml was administered subcutaneously in back of upper left arm. No irritation or drainage noted from site, band aid applied. Observed for 30 minutes, no s/s reaction noted. .       Ms. Rittre tolerated well, and had no complaints. Patient armband removed and shredded. Ms. Ana Escobar was discharged from [place] [address] in stable condition at 1440. She is to return on 2020 at 1400 for her next appointment for Nucala injeciton.     Maxine Pitts RN  2020  3:04 PM

## 2020-12-14 DIAGNOSIS — J82.83 EOSINOPHILIC ASTHMA: ICD-10-CM

## 2020-12-21 ENCOUNTER — APPOINTMENT (OUTPATIENT)
Dept: INFUSION THERAPY | Age: 75
End: 2020-12-21
Attending: INTERNAL MEDICINE
Payer: MEDICARE

## 2020-12-28 ENCOUNTER — APPOINTMENT (OUTPATIENT)
Dept: INFUSION THERAPY | Age: 75
End: 2020-12-28
Attending: INTERNAL MEDICINE
Payer: MEDICARE

## 2020-12-29 ENCOUNTER — HOSPITAL ENCOUNTER (OUTPATIENT)
Dept: INFUSION THERAPY | Age: 75
Discharge: HOME | End: 2020-12-29
Attending: INTERNAL MEDICINE
Payer: MEDICARE

## 2020-12-29 VITALS
HEART RATE: 74 BPM | SYSTOLIC BLOOD PRESSURE: 124 MMHG | DIASTOLIC BLOOD PRESSURE: 76 MMHG | OXYGEN SATURATION: 97 % | WEIGHT: 233.3 LBS | TEMPERATURE: 97.5 F | RESPIRATION RATE: 16 BRPM

## 2020-12-29 DIAGNOSIS — J82.83 EOSINOPHILIC ASTHMA: Primary | ICD-10-CM

## 2020-12-29 PROCEDURE — 74011250636 HC RX REV CODE- 250/636: Performed by: INTERNAL MEDICINE

## 2020-12-29 PROCEDURE — 96372 THER/PROPH/DIAG INJ SC/IM: CPT

## 2020-12-29 RX ADMIN — MEPOLIZUMAB 100 MG: 100 INJECTION, POWDER, FOR SOLUTION SUBCUTANEOUS at 14:29

## 2020-12-29 NOTE — PROGRESS NOTES
[facility] Progress Note    Date: 2020    Name: Sancho Sanchez    MRN: [id number]         : 1945      Nucala 100 mg injection every 28 days      Ms. Ritter was assessed and education was provided. Denies any problems with previous injection. Ms. Ritter's vitals were reviewed and patient was observed for 5 minutes prior to treatment. Visit Vitals  /76 (BP 1 Location: Left arm)   Pulse 74   Temp 97.5 °F (36.4 °C)   Resp 16   SpO2 97%   Breastfeeding No     Took patients blood pressure manually, because she was concern about the elevation in the automatic. Nucala 100 mg/1 ml was administered subcutaneously in back of upper left arm. No irritation or drainage noted from site, band aid applied. Observed for 30 minutes, no s/s reaction noted. .       Ms. Ritter tolerated well, and had no complaints. Patient armband removed and shredded. Ms. Kyler Ibarra was discharged from [place] [address] in stable condition at [phone] 90. She is to return on 2021 at 1400 for her next appointment for Nucala injeciton.     Hector Smith RN  2020  3:04 PM

## 2021-01-11 DIAGNOSIS — J82.83 EOSINOPHILIC ASTHMA: ICD-10-CM

## 2021-01-26 ENCOUNTER — HOSPITAL ENCOUNTER (OUTPATIENT)
Dept: INFUSION THERAPY | Age: 76
End: 2021-01-26
Attending: INTERNAL MEDICINE
Payer: MEDICARE

## 2021-01-27 ENCOUNTER — HOSPITAL ENCOUNTER (OUTPATIENT)
Dept: INFUSION THERAPY | Age: 76
Discharge: HOME | End: 2021-01-27
Attending: INTERNAL MEDICINE
Payer: MEDICARE

## 2021-01-27 VITALS
HEART RATE: 84 BPM | RESPIRATION RATE: 16 BRPM | SYSTOLIC BLOOD PRESSURE: 128 MMHG | OXYGEN SATURATION: 97 % | DIASTOLIC BLOOD PRESSURE: 78 MMHG | TEMPERATURE: 97.9 F

## 2021-01-27 DIAGNOSIS — J82.83 EOSINOPHILIC ASTHMA: Primary | ICD-10-CM

## 2021-01-27 PROCEDURE — 96372 THER/PROPH/DIAG INJ SC/IM: CPT

## 2021-01-27 PROCEDURE — 74011250636 HC RX REV CODE- 250/636: Performed by: INTERNAL MEDICINE

## 2021-01-27 RX ADMIN — MEPOLIZUMAB 100 MG: 100 INJECTION, POWDER, FOR SOLUTION SUBCUTANEOUS at 15:02

## 2021-01-27 NOTE — PROGRESS NOTES
[facility] Progress Note    Date: 2021    Name: Miriam Keene    MRN: [id number]         : 1945      Nucala 100 mg injection every 28 days      Ms. Ritter was assessed and education was provided. Denies any problems with previous injection. Ms. Ritter's vitals were reviewed and patient was observed for 5 minutes prior to treatment. Visit Vitals  /78 (BP 1 Location: Left arm)   Pulse 84   Temp 97.9 °F (36.6 °C)   Resp 16   SpO2 97%     Took patients blood pressure manually, because she was concern about the elevation in the automatic. Nucala 100 mg/1 ml was administered subcutaneously in back of upper left arm. No irritation or drainage noted from site, band aid applied. Observed for 30 minutes, no s/s reaction noted. .       Ms. Ritter tolerated well, and had no complaints. Patient armband removed and shredded. Ms. Jairon Carrillo was discharged from [place] [address] in stable condition at 1515. She is to return on 2021 at 1400 for her next appointment for Nucala injeciton.     Kerry Lisa RN  2021  3:04 PM

## 2021-02-08 DIAGNOSIS — J82.83 EOSINOPHILIC ASTHMA: ICD-10-CM

## 2021-02-17 RX ORDER — ALBUTEROL SULFATE 0.83 MG/ML
2.5 SOLUTION RESPIRATORY (INHALATION) AS NEEDED
Status: CANCELLED
Start: 2021-02-24

## 2021-02-17 RX ORDER — DIPHENHYDRAMINE HYDROCHLORIDE 50 MG/ML
50 INJECTION, SOLUTION INTRAMUSCULAR; INTRAVENOUS AS NEEDED
Status: CANCELLED
Start: 2021-02-24

## 2021-02-17 RX ORDER — EPINEPHRINE 1 MG/ML
0.3 INJECTION, SOLUTION, CONCENTRATE INTRAVENOUS AS NEEDED
Status: CANCELLED | OUTPATIENT
Start: 2021-02-24

## 2021-02-17 RX ORDER — HYDROCORTISONE SODIUM SUCCINATE 100 MG/2ML
100 INJECTION, POWDER, FOR SOLUTION INTRAMUSCULAR; INTRAVENOUS AS NEEDED
Status: CANCELLED | OUTPATIENT
Start: 2021-02-24

## 2021-02-17 RX ORDER — ACETAMINOPHEN 325 MG/1
650 TABLET ORAL AS NEEDED
Status: CANCELLED
Start: 2021-02-24

## 2021-02-17 RX ORDER — ONDANSETRON 2 MG/ML
8 INJECTION INTRAMUSCULAR; INTRAVENOUS AS NEEDED
Status: CANCELLED | OUTPATIENT
Start: 2021-02-24

## 2021-02-17 RX ORDER — DIPHENHYDRAMINE HYDROCHLORIDE 50 MG/ML
25 INJECTION, SOLUTION INTRAMUSCULAR; INTRAVENOUS AS NEEDED
Status: CANCELLED
Start: 2021-02-24

## 2021-02-24 ENCOUNTER — HOSPITAL ENCOUNTER (OUTPATIENT)
Dept: INFUSION THERAPY | Age: 76
End: 2021-02-24
Attending: INTERNAL MEDICINE

## 2021-03-18 ENCOUNTER — HOSPITAL ENCOUNTER (OUTPATIENT)
Dept: INFUSION THERAPY | Age: 76
Discharge: HOME | End: 2021-03-18
Attending: INTERNAL MEDICINE
Payer: MEDICARE

## 2021-03-18 VITALS
DIASTOLIC BLOOD PRESSURE: 76 MMHG | TEMPERATURE: 97.7 F | HEART RATE: 82 BPM | OXYGEN SATURATION: 97 % | RESPIRATION RATE: 16 BRPM | SYSTOLIC BLOOD PRESSURE: 115 MMHG

## 2021-03-18 DIAGNOSIS — J82.83 EOSINOPHILIC ASTHMA: Primary | ICD-10-CM

## 2021-03-18 PROCEDURE — 96372 THER/PROPH/DIAG INJ SC/IM: CPT

## 2021-03-18 PROCEDURE — 74011250636 HC RX REV CODE- 250/636: Performed by: INTERNAL MEDICINE

## 2021-03-18 RX ORDER — ALBUTEROL SULFATE 0.83 MG/ML
2.5 SOLUTION RESPIRATORY (INHALATION) AS NEEDED
Status: CANCELLED
Start: 2021-04-09

## 2021-03-18 RX ORDER — DIPHENHYDRAMINE HYDROCHLORIDE 50 MG/ML
50 INJECTION, SOLUTION INTRAMUSCULAR; INTRAVENOUS AS NEEDED
Status: CANCELLED
Start: 2021-04-09

## 2021-03-18 RX ORDER — ONDANSETRON 2 MG/ML
8 INJECTION INTRAMUSCULAR; INTRAVENOUS AS NEEDED
Status: CANCELLED | OUTPATIENT
Start: 2021-04-09

## 2021-03-18 RX ORDER — EPINEPHRINE 1 MG/ML
0.3 INJECTION, SOLUTION, CONCENTRATE INTRAVENOUS AS NEEDED
Status: CANCELLED | OUTPATIENT
Start: 2021-04-09

## 2021-03-18 RX ORDER — DIPHENHYDRAMINE HYDROCHLORIDE 50 MG/ML
25 INJECTION, SOLUTION INTRAMUSCULAR; INTRAVENOUS AS NEEDED
Status: CANCELLED
Start: 2021-04-09

## 2021-03-18 RX ORDER — HYDROCORTISONE SODIUM SUCCINATE 100 MG/2ML
100 INJECTION, POWDER, FOR SOLUTION INTRAMUSCULAR; INTRAVENOUS AS NEEDED
Status: CANCELLED | OUTPATIENT
Start: 2021-04-09

## 2021-03-18 RX ORDER — ACETAMINOPHEN 325 MG/1
650 TABLET ORAL AS NEEDED
Status: CANCELLED
Start: 2021-04-09

## 2021-03-18 RX ADMIN — MEPOLIZUMAB 100 MG: 100 INJECTION, POWDER, FOR SOLUTION SUBCUTANEOUS at 13:30

## 2021-03-18 NOTE — PROGRESS NOTES
[facility] Progress Note    Date: 2021    Name: Jake Rosario    MRN: [id number]         : 1945      Nucala 100 mg injection every 28 days      Ms. Ritter was assessed and education was provided. Denies any problems with previous injection. Ms. Ritter's vitals were reviewed and patient was observed for 5 minutes prior to treatment. Visit Vitals  /76 (BP 1 Location: Left upper arm, BP Patient Position: Sitting)   Pulse 82   Temp 97.7 °F (36.5 °C)   Resp 16   SpO2 97%   Breastfeeding No       Nucala 100 mg/1 ml was administered subcutaneously in back of upper left arm. No irritation or drainage noted from site, band aid applied. Observed for approximately 15 minutes, no s/s reaction noted. Patient armband removed and shredded. Ms. Sonja Cruz was discharged from [place] [address] in stable condition at [phone]. She is to return on 21  at 1400 for her next appointment for Nucala injeciton.     Eric Montiel RN  2021

## 2021-03-24 ENCOUNTER — APPOINTMENT (OUTPATIENT)
Dept: INFUSION THERAPY | Age: 76
End: 2021-03-24
Attending: INTERNAL MEDICINE
Payer: MEDICARE

## 2021-04-15 ENCOUNTER — HOSPITAL ENCOUNTER (OUTPATIENT)
Dept: INFUSION THERAPY | Age: 76
Discharge: HOME | End: 2021-04-15
Attending: INTERNAL MEDICINE
Payer: MEDICARE

## 2021-04-15 VITALS
TEMPERATURE: 97.4 F | RESPIRATION RATE: 18 BRPM | HEART RATE: 72 BPM | OXYGEN SATURATION: 98 % | DIASTOLIC BLOOD PRESSURE: 84 MMHG | SYSTOLIC BLOOD PRESSURE: 128 MMHG

## 2021-04-15 DIAGNOSIS — J82.83 EOSINOPHILIC ASTHMA: Primary | ICD-10-CM

## 2021-04-15 PROCEDURE — 74011250636 HC RX REV CODE- 250/636: Performed by: INTERNAL MEDICINE

## 2021-04-15 PROCEDURE — 96372 THER/PROPH/DIAG INJ SC/IM: CPT

## 2021-04-15 RX ORDER — HYDROCORTISONE SODIUM SUCCINATE 100 MG/2ML
100 INJECTION, POWDER, FOR SOLUTION INTRAMUSCULAR; INTRAVENOUS AS NEEDED
Status: CANCELLED | OUTPATIENT
Start: 2021-05-13

## 2021-04-15 RX ORDER — ALBUTEROL SULFATE 0.83 MG/ML
2.5 SOLUTION RESPIRATORY (INHALATION) AS NEEDED
Status: CANCELLED
Start: 2021-05-13

## 2021-04-15 RX ORDER — DIPHENHYDRAMINE HYDROCHLORIDE 50 MG/ML
50 INJECTION, SOLUTION INTRAMUSCULAR; INTRAVENOUS AS NEEDED
Status: CANCELLED
Start: 2021-05-13

## 2021-04-15 RX ORDER — DIPHENHYDRAMINE HYDROCHLORIDE 50 MG/ML
25 INJECTION, SOLUTION INTRAMUSCULAR; INTRAVENOUS AS NEEDED
Status: CANCELLED
Start: 2021-05-13

## 2021-04-15 RX ORDER — ACETAMINOPHEN 325 MG/1
650 TABLET ORAL AS NEEDED
Status: CANCELLED
Start: 2021-05-13

## 2021-04-15 RX ORDER — ONDANSETRON 2 MG/ML
8 INJECTION INTRAMUSCULAR; INTRAVENOUS AS NEEDED
Status: CANCELLED | OUTPATIENT
Start: 2021-05-13

## 2021-04-15 RX ORDER — EPINEPHRINE 1 MG/ML
0.3 INJECTION, SOLUTION, CONCENTRATE INTRAVENOUS AS NEEDED
Status: CANCELLED | OUTPATIENT
Start: 2021-05-13

## 2021-04-15 RX ADMIN — MEPOLIZUMAB 100 MG: 100 INJECTION, POWDER, FOR SOLUTION SUBCUTANEOUS at 13:32

## 2021-04-15 NOTE — PROGRESS NOTES
[facility] Progress Note    Date: April 15, 2021    Name: Jose Daniel Guerra    MRN: [id number]         : 1945      Nucala 100 mg injection every 28 days      Ms. Ritter was assessed and education was provided. Denies any problems with previous injection. Ms. Ritter's vitals were reviewed and patient was observed for 5 minutes prior to treatment. Visit Vitals  /84 (BP 1 Location: Left arm)   Pulse 72   Temp 97.4 °F (36.3 °C)   Resp 18   SpO2 98%   Breastfeeding No     Took patients blood pressure manually, because she was concern about the elevation in the automatic. Nucala 100 mg/1 ml was administered subcutaneously in back of upper left arm. No irritation or drainage noted from site, band aid applied. Observed for 30 minutes, no s/s reaction noted. .       Ms. Ritter tolerated well, and had no complaints. Patient armband removed and shredded. Ms. Dinh Zimmerman was discharged from [place] [address] in stable condition at [phone]. She is to return on 2021 at 1400 for her next appointment for Nucala injeciton.     Mic Parrish RN  April 15, 2021  3:04 PM

## 2021-05-12 ENCOUNTER — APPOINTMENT (OUTPATIENT)
Dept: INFUSION THERAPY | Age: 76
End: 2021-05-12
Attending: INTERNAL MEDICINE
Payer: MEDICARE

## 2021-05-13 ENCOUNTER — HOSPITAL ENCOUNTER (OUTPATIENT)
Dept: INFUSION THERAPY | Age: 76
Discharge: HOME | End: 2021-05-13
Attending: INTERNAL MEDICINE
Payer: MEDICARE

## 2021-05-13 VITALS
HEART RATE: 74 BPM | DIASTOLIC BLOOD PRESSURE: 70 MMHG | RESPIRATION RATE: 18 BRPM | SYSTOLIC BLOOD PRESSURE: 129 MMHG | TEMPERATURE: 98.7 F | OXYGEN SATURATION: 97 %

## 2021-05-13 DIAGNOSIS — J82.83 EOSINOPHILIC ASTHMA: Primary | ICD-10-CM

## 2021-05-13 PROCEDURE — 74011250636 HC RX REV CODE- 250/636: Performed by: INTERNAL MEDICINE

## 2021-05-13 PROCEDURE — 96372 THER/PROPH/DIAG INJ SC/IM: CPT

## 2021-05-13 RX ORDER — ONDANSETRON 2 MG/ML
8 INJECTION INTRAMUSCULAR; INTRAVENOUS AS NEEDED
Status: CANCELLED | OUTPATIENT
Start: 2021-06-10

## 2021-05-13 RX ORDER — HYDROCORTISONE SODIUM SUCCINATE 100 MG/2ML
100 INJECTION, POWDER, FOR SOLUTION INTRAMUSCULAR; INTRAVENOUS AS NEEDED
Status: CANCELLED | OUTPATIENT
Start: 2021-06-10

## 2021-05-13 RX ORDER — ALBUTEROL SULFATE 0.83 MG/ML
2.5 SOLUTION RESPIRATORY (INHALATION) AS NEEDED
Status: CANCELLED
Start: 2021-06-10

## 2021-05-13 RX ORDER — ACETAMINOPHEN 325 MG/1
650 TABLET ORAL AS NEEDED
Status: CANCELLED
Start: 2021-06-10

## 2021-05-13 RX ORDER — EPINEPHRINE 1 MG/ML
0.3 INJECTION, SOLUTION, CONCENTRATE INTRAVENOUS AS NEEDED
Status: CANCELLED | OUTPATIENT
Start: 2021-06-10

## 2021-05-13 RX ORDER — DIPHENHYDRAMINE HYDROCHLORIDE 50 MG/ML
50 INJECTION, SOLUTION INTRAMUSCULAR; INTRAVENOUS AS NEEDED
Status: CANCELLED
Start: 2021-06-10

## 2021-05-13 RX ORDER — DIPHENHYDRAMINE HYDROCHLORIDE 50 MG/ML
25 INJECTION, SOLUTION INTRAMUSCULAR; INTRAVENOUS AS NEEDED
Status: CANCELLED
Start: 2021-06-10

## 2021-05-13 RX ADMIN — MEPOLIZUMAB 100 MG: 100 INJECTION, POWDER, FOR SOLUTION SUBCUTANEOUS at 14:30

## 2021-05-13 NOTE — PROGRESS NOTES
[facility] Progress Note    Date: May 13, 2021    Name: Coco Ramos    MRN: [id number]         : 1945      Nucala 100 mg injection every 28 days      Ms. Ritter was assessed and education was provided. Denies any problems with previous injection. Ms. Ritter's vitals were reviewed and patient was observed for 5 minutes prior to treatment. Visit Vitals  /70 (BP 1 Location: Left arm, BP Patient Position: Sitting)   Pulse 74   Temp 98.7 °F (37.1 °C)   Resp 18   SpO2 97%   Breastfeeding No     Nucala 100 mg/1 ml was administered subcutaneously in back of upper left arm. No irritation or drainage noted from site, band aid applied. Observed for 30 minutes, no s/s reaction noted. .       Ms. Ritter tolerated well, and had no complaints. Patient armband removed and shredded. Ms. Loulou Hargrove was discharged from [place] [address] in stable condition at 1505. She is to return on 06/10/2021 at 1400 for her next appointment for Nucala injeciton.     Kedar Machado RN  May 13, 2021  3:04 PM

## 2021-06-10 ENCOUNTER — HOSPITAL ENCOUNTER (OUTPATIENT)
Dept: INFUSION THERAPY | Age: 76
Discharge: HOME | End: 2021-06-10
Attending: INTERNAL MEDICINE
Payer: MEDICARE

## 2021-06-10 VITALS
OXYGEN SATURATION: 96 % | HEART RATE: 77 BPM | SYSTOLIC BLOOD PRESSURE: 126 MMHG | TEMPERATURE: 98.5 F | DIASTOLIC BLOOD PRESSURE: 75 MMHG | RESPIRATION RATE: 18 BRPM

## 2021-06-10 DIAGNOSIS — J82.83 EOSINOPHILIC ASTHMA: Primary | ICD-10-CM

## 2021-06-10 PROCEDURE — 96372 THER/PROPH/DIAG INJ SC/IM: CPT

## 2021-06-10 PROCEDURE — 74011250636 HC RX REV CODE- 250/636: Performed by: INTERNAL MEDICINE

## 2021-06-10 RX ORDER — ONDANSETRON 2 MG/ML
8 INJECTION INTRAMUSCULAR; INTRAVENOUS AS NEEDED
Status: CANCELLED | OUTPATIENT
Start: 2021-07-08

## 2021-06-10 RX ORDER — DIPHENHYDRAMINE HYDROCHLORIDE 50 MG/ML
50 INJECTION, SOLUTION INTRAMUSCULAR; INTRAVENOUS AS NEEDED
Status: CANCELLED
Start: 2021-07-08

## 2021-06-10 RX ORDER — ACETAMINOPHEN 325 MG/1
650 TABLET ORAL AS NEEDED
Status: CANCELLED
Start: 2021-07-08

## 2021-06-10 RX ORDER — EPINEPHRINE 1 MG/ML
0.3 INJECTION, SOLUTION, CONCENTRATE INTRAVENOUS AS NEEDED
Status: CANCELLED | OUTPATIENT
Start: 2021-07-08

## 2021-06-10 RX ORDER — ALBUTEROL SULFATE 0.83 MG/ML
2.5 SOLUTION RESPIRATORY (INHALATION) AS NEEDED
Status: CANCELLED
Start: 2021-07-08

## 2021-06-10 RX ORDER — HYDROCORTISONE SODIUM SUCCINATE 100 MG/2ML
100 INJECTION, POWDER, FOR SOLUTION INTRAMUSCULAR; INTRAVENOUS AS NEEDED
Status: CANCELLED | OUTPATIENT
Start: 2021-07-08

## 2021-06-10 RX ORDER — DIPHENHYDRAMINE HYDROCHLORIDE 50 MG/ML
25 INJECTION, SOLUTION INTRAMUSCULAR; INTRAVENOUS AS NEEDED
Status: CANCELLED
Start: 2021-07-08

## 2021-06-10 RX ADMIN — MEPOLIZUMAB 100 MG: 100 INJECTION, POWDER, FOR SOLUTION SUBCUTANEOUS at 14:46

## 2021-06-10 NOTE — PROGRESS NOTES
[facility] Progress Note    Date: Елена 10, 2021    Name: Janes Erickson    MRN: [id number]         : 1945      Nucala 100 mg injection every 28 days      Ms. Ritter was assessed and education was provided. Denies any problems with previous injection. Ms. Ritter's vitals were reviewed and patient was observed for 5 minutes prior to treatment. Visit Vitals  /75 (BP 1 Location: Left arm, BP Patient Position: Sitting)   Pulse 77   Temp 98.5 °F (36.9 °C)   Resp 18   SpO2 96%   Breastfeeding No     Nucala 100 mg/1 ml was administered subcutaneously in back of upper left right arm. No irritation or drainage noted from site, band aid applied. Observed for 30 minutes, no s/s reaction noted. Ms. Junito Yoder tolerated well, and had no complaints. Patient armband removed and shredded. Ms. Junito Yoder was discharged from [place] [address] in stable condition at 1500. She is to return on 2021 at 1400 for her next appointment for Nucala injeciton.     Nash Herman RN  Елена 10, 2021  3:04 PM

## 2021-07-08 ENCOUNTER — HOSPITAL ENCOUNTER (OUTPATIENT)
Dept: INFUSION THERAPY | Age: 76
Discharge: HOME | End: 2021-07-08
Attending: INTERNAL MEDICINE
Payer: MEDICARE

## 2021-07-08 DIAGNOSIS — J82.83 EOSINOPHILIC ASTHMA: Primary | ICD-10-CM

## 2021-07-08 PROCEDURE — 96372 THER/PROPH/DIAG INJ SC/IM: CPT

## 2021-07-08 PROCEDURE — 74011250636 HC RX REV CODE- 250/636: Performed by: INTERNAL MEDICINE

## 2021-07-08 RX ORDER — ONDANSETRON 2 MG/ML
8 INJECTION INTRAMUSCULAR; INTRAVENOUS AS NEEDED
Status: CANCELLED | OUTPATIENT
Start: 2021-08-05

## 2021-07-08 RX ORDER — DIPHENHYDRAMINE HYDROCHLORIDE 50 MG/ML
25 INJECTION, SOLUTION INTRAMUSCULAR; INTRAVENOUS AS NEEDED
Status: CANCELLED
Start: 2021-08-05

## 2021-07-08 RX ORDER — HYDROCORTISONE SODIUM SUCCINATE 100 MG/2ML
100 INJECTION, POWDER, FOR SOLUTION INTRAMUSCULAR; INTRAVENOUS AS NEEDED
Status: CANCELLED | OUTPATIENT
Start: 2021-08-05

## 2021-07-08 RX ORDER — DIPHENHYDRAMINE HYDROCHLORIDE 50 MG/ML
50 INJECTION, SOLUTION INTRAMUSCULAR; INTRAVENOUS AS NEEDED
Status: CANCELLED
Start: 2021-08-05

## 2021-07-08 RX ORDER — EPINEPHRINE 1 MG/ML
0.3 INJECTION, SOLUTION, CONCENTRATE INTRAVENOUS AS NEEDED
Status: CANCELLED | OUTPATIENT
Start: 2021-08-05

## 2021-07-08 RX ORDER — ALBUTEROL SULFATE 0.83 MG/ML
2.5 SOLUTION RESPIRATORY (INHALATION) AS NEEDED
Status: CANCELLED
Start: 2021-08-05

## 2021-07-08 RX ORDER — ACETAMINOPHEN 325 MG/1
650 TABLET ORAL AS NEEDED
Status: CANCELLED
Start: 2021-08-05

## 2021-07-08 RX ADMIN — MEPOLIZUMAB 100 MG: 100 INJECTION, POWDER, FOR SOLUTION SUBCUTANEOUS at 14:31

## 2021-07-08 NOTE — PROGRESS NOTES
[facility] Progress Note    Date: 2021    Name: Benny Olivia    MRN: [id number]         : 1945      Nucala 100 mg injection every 28 days      Ms. Ritter was assessed and education was provided. Denies any problems with previous injection. Ms. Ritter's vitals were reviewed and patient was observed for 5 minutes prior to treatment. Visit Vitals  /76 (BP 1 Location: Right arm, BP Patient Position: Sitting)   Pulse 77   Temp 98.5 °F (36.9 °C)   Resp 18   SpO2 98%     Nucala 100 mg/1 ml was administered subcutaneously in back of upper left right arm. No irritation or drainage noted from site, band aid applied. Observed for 30 minutes, no s/s reaction noted. Ms. Madelaine Gomez tolerated well, and had no complaints. Patient armband removed and shredded. Ms. Madelaine Gomez was discharged from [place] [address] in stable condition at 1530. She is to return on 2021 at 1400 for her next appointment for Nucala injeciton.     Annie Rutledge RN  2021  3:04 PM

## 2021-07-09 VITALS
DIASTOLIC BLOOD PRESSURE: 76 MMHG | RESPIRATION RATE: 18 BRPM | HEART RATE: 77 BPM | OXYGEN SATURATION: 98 % | SYSTOLIC BLOOD PRESSURE: 127 MMHG | TEMPERATURE: 98.5 F

## 2021-08-05 ENCOUNTER — HOSPITAL ENCOUNTER (OUTPATIENT)
Dept: INFUSION THERAPY | Age: 76
Discharge: HOME | End: 2021-08-05
Attending: INTERNAL MEDICINE
Payer: MEDICARE

## 2021-08-05 VITALS
HEART RATE: 78 BPM | RESPIRATION RATE: 18 BRPM | SYSTOLIC BLOOD PRESSURE: 138 MMHG | OXYGEN SATURATION: 98 % | TEMPERATURE: 97.9 F | DIASTOLIC BLOOD PRESSURE: 74 MMHG

## 2021-08-05 DIAGNOSIS — J82.83 EOSINOPHILIC ASTHMA: Primary | ICD-10-CM

## 2021-08-05 PROCEDURE — 96372 THER/PROPH/DIAG INJ SC/IM: CPT

## 2021-08-05 PROCEDURE — 74011250636 HC RX REV CODE- 250/636: Performed by: INTERNAL MEDICINE

## 2021-08-05 RX ORDER — DIPHENHYDRAMINE HYDROCHLORIDE 50 MG/ML
50 INJECTION, SOLUTION INTRAMUSCULAR; INTRAVENOUS AS NEEDED
Status: CANCELLED
Start: 2021-09-02

## 2021-08-05 RX ORDER — HYDROCORTISONE SODIUM SUCCINATE 100 MG/2ML
100 INJECTION, POWDER, FOR SOLUTION INTRAMUSCULAR; INTRAVENOUS AS NEEDED
Status: CANCELLED | OUTPATIENT
Start: 2021-09-02

## 2021-08-05 RX ORDER — DIPHENHYDRAMINE HYDROCHLORIDE 50 MG/ML
25 INJECTION, SOLUTION INTRAMUSCULAR; INTRAVENOUS AS NEEDED
Status: CANCELLED
Start: 2021-09-02

## 2021-08-05 RX ORDER — ONDANSETRON 2 MG/ML
8 INJECTION INTRAMUSCULAR; INTRAVENOUS AS NEEDED
Status: CANCELLED | OUTPATIENT
Start: 2021-09-02

## 2021-08-05 RX ORDER — ALBUTEROL SULFATE 0.83 MG/ML
2.5 SOLUTION RESPIRATORY (INHALATION) AS NEEDED
Status: CANCELLED
Start: 2021-09-02

## 2021-08-05 RX ORDER — EPINEPHRINE 1 MG/ML
0.3 INJECTION, SOLUTION, CONCENTRATE INTRAVENOUS AS NEEDED
Status: CANCELLED | OUTPATIENT
Start: 2021-09-02

## 2021-08-05 RX ORDER — ACETAMINOPHEN 325 MG/1
650 TABLET ORAL AS NEEDED
Status: CANCELLED
Start: 2021-09-02

## 2021-08-05 RX ADMIN — MEPOLIZUMAB 100 MG: 100 INJECTION, POWDER, FOR SOLUTION SUBCUTANEOUS at 14:28

## 2021-08-05 NOTE — PROGRESS NOTES
[facility] Progress Note    Date: 2021    Name: Isabel Hernandez    MRN: [id number]         : 1945      Nucala 100 mg injection every 28 days      Ms. Ritter was assessed and education was provided. Denies any problems with previous injection. Ms. Ritter's vitals were reviewed and patient was observed for 5 minutes prior to treatment. Visit Vitals  /74 (BP 1 Location: Right arm, BP Patient Position: Sitting)   Pulse 78   Temp 97.9 °F (36.6 °C)   Resp 18   SpO2 98%   Breastfeeding No     Took patients blood pressure manually, because she was concern about the elevation in the automatic. Nucala 100 mg/1 ml was administered subcutaneously in back of upper left arm. No irritation or drainage noted from site, band aid applied. Observed for 30 minutes, no s/s reaction noted. .       Ms. Ritter tolerated well, and had no complaints. Patient armband removed and shredded. Ms. Néstor Ruiz was discharged from [place] [address] in stable condition at [phone]. She is to return on 2021 at 1400 for her next appointment for Nucala injeciton.     Michelle Obregon RN  2021  3:04 PM

## 2021-09-02 ENCOUNTER — APPOINTMENT (OUTPATIENT)
Dept: INFUSION THERAPY | Age: 76
End: 2021-09-02
Attending: INTERNAL MEDICINE

## 2021-09-13 ENCOUNTER — HOSPITAL ENCOUNTER (OUTPATIENT)
Dept: INFUSION THERAPY | Age: 76
Discharge: HOME | End: 2021-09-13
Attending: INTERNAL MEDICINE
Payer: MEDICARE

## 2021-09-13 VITALS
HEART RATE: 77 BPM | DIASTOLIC BLOOD PRESSURE: 70 MMHG | TEMPERATURE: 97.7 F | OXYGEN SATURATION: 98 % | RESPIRATION RATE: 18 BRPM | SYSTOLIC BLOOD PRESSURE: 124 MMHG

## 2021-09-13 DIAGNOSIS — J82.83 EOSINOPHILIC ASTHMA: Primary | ICD-10-CM

## 2021-09-13 PROCEDURE — 74011250636 HC RX REV CODE- 250/636: Performed by: INTERNAL MEDICINE

## 2021-09-13 PROCEDURE — 96372 THER/PROPH/DIAG INJ SC/IM: CPT

## 2021-09-13 RX ORDER — HYDROCORTISONE SODIUM SUCCINATE 100 MG/2ML
100 INJECTION, POWDER, FOR SOLUTION INTRAMUSCULAR; INTRAVENOUS AS NEEDED
Status: CANCELLED | OUTPATIENT
Start: 2021-10-11

## 2021-09-13 RX ORDER — ONDANSETRON 2 MG/ML
8 INJECTION INTRAMUSCULAR; INTRAVENOUS AS NEEDED
Status: CANCELLED | OUTPATIENT
Start: 2021-10-11

## 2021-09-13 RX ORDER — DIPHENHYDRAMINE HYDROCHLORIDE 50 MG/ML
25 INJECTION, SOLUTION INTRAMUSCULAR; INTRAVENOUS AS NEEDED
Status: CANCELLED
Start: 2021-10-11

## 2021-09-13 RX ORDER — ACETAMINOPHEN 325 MG/1
650 TABLET ORAL AS NEEDED
Status: CANCELLED
Start: 2021-10-11

## 2021-09-13 RX ORDER — ALBUTEROL SULFATE 0.83 MG/ML
2.5 SOLUTION RESPIRATORY (INHALATION) AS NEEDED
Status: CANCELLED
Start: 2021-10-11

## 2021-09-13 RX ORDER — DIPHENHYDRAMINE HYDROCHLORIDE 50 MG/ML
50 INJECTION, SOLUTION INTRAMUSCULAR; INTRAVENOUS AS NEEDED
Status: CANCELLED
Start: 2021-10-11

## 2021-09-13 RX ORDER — EPINEPHRINE 1 MG/ML
0.3 INJECTION, SOLUTION, CONCENTRATE INTRAVENOUS AS NEEDED
Status: CANCELLED | OUTPATIENT
Start: 2021-10-11

## 2021-09-13 RX ADMIN — MEPOLIZUMAB 100 MG: 100 INJECTION, POWDER, FOR SOLUTION SUBCUTANEOUS at 14:27

## 2021-09-13 NOTE — PROGRESS NOTES
[facility] Progress Note    Date: 2021    Name: Sheri Head    MRN: [id number]         : 1945      Nucala 100 mg injection every 28 days      Ms. Ritter was assessed and education was provided. Denies any problems with previous injection. Ms. Ritter's vitals were reviewed and patient was observed for 5 minutes prior to treatment. Visit Vitals  /70   Pulse 77   Temp 97.7 °F (36.5 °C)   Resp 18   SpO2 98%     Patient Vitals for the past 12 hrs:   Temp Pulse Resp BP SpO2   21 1456    124/70    21 1423 97.7 °F (36.5 °C) 77 18 139/75 98 %       Nucala 100 mg/1 ml was administered subcutaneously in back of upper left arm. No irritation or drainage noted from site, band aid applied. Observed for 30 minutes, no s/s reaction noted. .       Ms. Ritter tolerated well, and had no complaints. Patient armband removed and shredded. Ms. Linnea Pritchard was discharged from [place] [address] in stable condition at 1450. She is to return on 10/21/2021 at 1400 for her next appointment for Nucala injeciton.     Reyna Nesbitt RN  2021  3:04 PM

## 2021-09-30 ENCOUNTER — APPOINTMENT (OUTPATIENT)
Dept: INFUSION THERAPY | Age: 76
End: 2021-09-30
Attending: INTERNAL MEDICINE

## 2021-10-11 ENCOUNTER — APPOINTMENT (OUTPATIENT)
Dept: INFUSION THERAPY | Age: 76
End: 2021-10-11
Attending: INTERNAL MEDICINE

## 2021-10-21 ENCOUNTER — HOSPITAL ENCOUNTER (OUTPATIENT)
Dept: INFUSION THERAPY | Age: 76
Discharge: HOME | End: 2021-10-21
Attending: INTERNAL MEDICINE
Payer: MEDICARE

## 2021-10-21 VITALS
TEMPERATURE: 98 F | OXYGEN SATURATION: 98 % | RESPIRATION RATE: 18 BRPM | SYSTOLIC BLOOD PRESSURE: 138 MMHG | DIASTOLIC BLOOD PRESSURE: 72 MMHG | HEART RATE: 80 BPM

## 2021-10-21 DIAGNOSIS — J82.83 EOSINOPHILIC ASTHMA: Primary | ICD-10-CM

## 2021-10-21 PROCEDURE — 74011250636 HC RX REV CODE- 250/636: Performed by: INTERNAL MEDICINE

## 2021-10-21 PROCEDURE — 96372 THER/PROPH/DIAG INJ SC/IM: CPT

## 2021-10-21 RX ORDER — DIPHENHYDRAMINE HYDROCHLORIDE 50 MG/ML
25 INJECTION, SOLUTION INTRAMUSCULAR; INTRAVENOUS AS NEEDED
Status: CANCELLED
Start: 2021-11-18

## 2021-10-21 RX ORDER — ONDANSETRON 2 MG/ML
8 INJECTION INTRAMUSCULAR; INTRAVENOUS AS NEEDED
Status: CANCELLED | OUTPATIENT
Start: 2021-11-18

## 2021-10-21 RX ORDER — DIPHENHYDRAMINE HYDROCHLORIDE 50 MG/ML
50 INJECTION, SOLUTION INTRAMUSCULAR; INTRAVENOUS AS NEEDED
Status: CANCELLED
Start: 2021-11-18

## 2021-10-21 RX ORDER — EPINEPHRINE 1 MG/ML
0.3 INJECTION, SOLUTION, CONCENTRATE INTRAVENOUS AS NEEDED
Status: CANCELLED | OUTPATIENT
Start: 2021-11-18

## 2021-10-21 RX ORDER — ALBUTEROL SULFATE 0.83 MG/ML
2.5 SOLUTION RESPIRATORY (INHALATION) AS NEEDED
Status: CANCELLED
Start: 2021-11-18

## 2021-10-21 RX ORDER — HYDROCORTISONE SODIUM SUCCINATE 100 MG/2ML
100 INJECTION, POWDER, FOR SOLUTION INTRAMUSCULAR; INTRAVENOUS AS NEEDED
Status: CANCELLED | OUTPATIENT
Start: 2021-11-18

## 2021-10-21 RX ORDER — ACETAMINOPHEN 325 MG/1
650 TABLET ORAL AS NEEDED
Status: CANCELLED
Start: 2021-11-18

## 2021-10-21 RX ADMIN — MEPOLIZUMAB 100 MG: 100 INJECTION, POWDER, FOR SOLUTION SUBCUTANEOUS at 14:28

## 2021-10-21 NOTE — PROGRESS NOTES
[facility] Progress Note    Date: 2021    Name: Jake Rosario    MRN: [id number]         : 1945      Nucala 100 mg injection every 28 days      Ms. Ritter was assessed and education was provided. Denies any problems with previous injection. Ms. Ritter's vitals were reviewed and patient was observed for 5 minutes prior to treatment. Visit Vitals  /72 (BP 1 Location: Right arm, BP Patient Position: Sitting)   Pulse 80   Temp 98 °F (36.7 °C)   Resp 18   SpO2 98%   Breastfeeding No     Took patients blood pressure manually, because she was concern about the elevation in the automatic. Nucala 100 mg/1 ml was administered subcutaneously in back of upper left arm. No irritation or drainage noted from site, band aid applied. Observed for 30 minutes, no s/s reaction noted. .     Patient Vitals for the past 12 hrs:   Temp Pulse Resp BP SpO2   10/21/21 1455 98 °F (36.7 °C) 80 18 138/72 98 %   10/21/21 1420 98 °F (36.7 °C) 78 18 132/80 97 %         Ms. Ritter tolerated well, and had no complaints. Patient armband removed and shredded. Ms. Sonja Cruz was discharged from [place] [address] in stable condition at 1500. She is to return on 2021 at 1400 for her next appointment for Nucala injeciton.     Jose Elmore RN  2021

## 2021-11-24 ENCOUNTER — HOSPITAL ENCOUNTER (OUTPATIENT)
Dept: INFUSION THERAPY | Age: 76
Discharge: HOME | End: 2021-11-24
Attending: INTERNAL MEDICINE
Payer: MEDICARE

## 2021-11-24 VITALS
RESPIRATION RATE: 18 BRPM | HEART RATE: 73 BPM | TEMPERATURE: 97 F | OXYGEN SATURATION: 98 % | DIASTOLIC BLOOD PRESSURE: 68 MMHG | SYSTOLIC BLOOD PRESSURE: 132 MMHG

## 2021-11-24 DIAGNOSIS — J82.83 EOSINOPHILIC ASTHMA: Primary | ICD-10-CM

## 2021-11-24 PROCEDURE — 96372 THER/PROPH/DIAG INJ SC/IM: CPT

## 2021-11-24 PROCEDURE — 74011250636 HC RX REV CODE- 250/636: Performed by: INTERNAL MEDICINE

## 2021-11-24 RX ORDER — WATER FOR INJECTION,STERILE
VIAL (ML) INJECTION
Status: DISCONTINUED
Start: 2021-11-24 | End: 2021-11-25 | Stop reason: HOSPADM

## 2021-11-24 RX ORDER — DIPHENHYDRAMINE HYDROCHLORIDE 50 MG/ML
50 INJECTION, SOLUTION INTRAMUSCULAR; INTRAVENOUS AS NEEDED
Status: CANCELLED
Start: 2021-12-22

## 2021-11-24 RX ORDER — HYDROCORTISONE SODIUM SUCCINATE 100 MG/2ML
100 INJECTION, POWDER, FOR SOLUTION INTRAMUSCULAR; INTRAVENOUS AS NEEDED
Status: CANCELLED | OUTPATIENT
Start: 2021-12-22

## 2021-11-24 RX ORDER — ALBUTEROL SULFATE 0.83 MG/ML
2.5 SOLUTION RESPIRATORY (INHALATION) AS NEEDED
Status: CANCELLED
Start: 2021-12-22

## 2021-11-24 RX ORDER — ONDANSETRON 2 MG/ML
8 INJECTION INTRAMUSCULAR; INTRAVENOUS AS NEEDED
Status: CANCELLED | OUTPATIENT
Start: 2021-12-22

## 2021-11-24 RX ORDER — DIPHENHYDRAMINE HYDROCHLORIDE 50 MG/ML
25 INJECTION, SOLUTION INTRAMUSCULAR; INTRAVENOUS AS NEEDED
Status: CANCELLED
Start: 2021-12-22

## 2021-11-24 RX ORDER — ACETAMINOPHEN 325 MG/1
650 TABLET ORAL AS NEEDED
Status: CANCELLED
Start: 2021-12-22

## 2021-11-24 RX ORDER — EPINEPHRINE 1 MG/ML
0.3 INJECTION, SOLUTION, CONCENTRATE INTRAVENOUS AS NEEDED
Status: CANCELLED | OUTPATIENT
Start: 2021-12-22

## 2021-11-24 RX ADMIN — MEPOLIZUMAB 100 MG: 100 INJECTION, POWDER, FOR SOLUTION SUBCUTANEOUS at 14:19

## 2021-12-22 ENCOUNTER — HOSPITAL ENCOUNTER (OUTPATIENT)
Dept: INFUSION THERAPY | Age: 76
Discharge: HOME | End: 2021-12-22
Attending: INTERNAL MEDICINE
Payer: MEDICARE

## 2021-12-22 VITALS
SYSTOLIC BLOOD PRESSURE: 126 MMHG | DIASTOLIC BLOOD PRESSURE: 78 MMHG | RESPIRATION RATE: 18 BRPM | HEART RATE: 85 BPM | TEMPERATURE: 97.6 F | OXYGEN SATURATION: 97 %

## 2021-12-22 DIAGNOSIS — J82.83 EOSINOPHILIC ASTHMA: Primary | ICD-10-CM

## 2021-12-22 PROCEDURE — 96372 THER/PROPH/DIAG INJ SC/IM: CPT

## 2021-12-22 PROCEDURE — 74011250636 HC RX REV CODE- 250/636: Performed by: INTERNAL MEDICINE

## 2021-12-22 RX ORDER — ONDANSETRON 2 MG/ML
8 INJECTION INTRAMUSCULAR; INTRAVENOUS AS NEEDED
Status: CANCELLED | OUTPATIENT
Start: 2022-01-19

## 2021-12-22 RX ORDER — ISOSORBIDE MONONITRATE 30 MG/1
30 TABLET, EXTENDED RELEASE ORAL DAILY
COMMUNITY

## 2021-12-22 RX ORDER — HYDROCORTISONE SODIUM SUCCINATE 100 MG/2ML
100 INJECTION, POWDER, FOR SOLUTION INTRAMUSCULAR; INTRAVENOUS AS NEEDED
Status: CANCELLED | OUTPATIENT
Start: 2022-01-19

## 2021-12-22 RX ORDER — DIPHENHYDRAMINE HYDROCHLORIDE 50 MG/ML
50 INJECTION, SOLUTION INTRAMUSCULAR; INTRAVENOUS AS NEEDED
Status: CANCELLED
Start: 2022-01-19

## 2021-12-22 RX ORDER — DIPHENHYDRAMINE HYDROCHLORIDE 50 MG/ML
25 INJECTION, SOLUTION INTRAMUSCULAR; INTRAVENOUS AS NEEDED
Status: CANCELLED
Start: 2022-01-19

## 2021-12-22 RX ORDER — ALBUTEROL SULFATE 0.83 MG/ML
2.5 SOLUTION RESPIRATORY (INHALATION) AS NEEDED
Status: CANCELLED
Start: 2022-01-19

## 2021-12-22 RX ORDER — EPINEPHRINE 1 MG/ML
0.3 INJECTION, SOLUTION, CONCENTRATE INTRAVENOUS AS NEEDED
Status: CANCELLED | OUTPATIENT
Start: 2022-01-19

## 2021-12-22 RX ORDER — ACETAMINOPHEN 325 MG/1
650 TABLET ORAL AS NEEDED
Status: CANCELLED
Start: 2022-01-19

## 2021-12-22 RX ADMIN — MEPOLIZUMAB 100 MG: 100 INJECTION, POWDER, FOR SOLUTION SUBCUTANEOUS at 14:25

## 2021-12-22 NOTE — PROGRESS NOTES
[facility] Progress Note    Date: 2021    Name: Kendra Sargent    MRN: [id number]         : 1945      Nucala 100 mg injection every 28 days      Ms. Ritter was assessed and education was provided. Denies any problems with previous injection. Ms. Ritter's vitals were reviewed and patient was observed for 5 minutes prior to treatment. Visit Vitals  /78 (BP 1 Location: Right arm, BP Patient Position: Sitting)   Pulse 85   Temp 97.6 °F (36.4 °C)   Resp 18   SpO2 97%   Breastfeeding No     Took patients blood pressure manually, because she was concern about the elevation in the automatic. Nucala 100 mg/1 ml was administered subcutaneously in back of upper right arm. No irritation or drainage noted from site, band aid applied. Observed for approximately 20 minutes, no s/s reaction noted. .       Ms. Ritter tolerated well, and had no complaints. Patient armband removed and shredded. Ms. Augustine Simpson was discharged from [place] [address] in stable condition at 1440. She is to return on 2022 at 1400 for her next appointment for Nucala injeciton.     Sarkis Parikh RN  2021

## 2022-01-19 ENCOUNTER — HOSPITAL ENCOUNTER (OUTPATIENT)
Dept: INFUSION THERAPY | Age: 77
Discharge: HOME | End: 2022-01-19
Attending: INTERNAL MEDICINE
Payer: MEDICARE

## 2022-01-19 VITALS
HEART RATE: 73 BPM | SYSTOLIC BLOOD PRESSURE: 114 MMHG | TEMPERATURE: 97.6 F | RESPIRATION RATE: 18 BRPM | DIASTOLIC BLOOD PRESSURE: 66 MMHG | OXYGEN SATURATION: 96 %

## 2022-01-19 DIAGNOSIS — J45.50 SEVERE PERSISTENT ASTHMA, UNSPECIFIED WHETHER COMPLICATED: Primary | ICD-10-CM

## 2022-01-19 PROCEDURE — 74011250636 HC RX REV CODE- 250/636: Performed by: INTERNAL MEDICINE

## 2022-01-19 PROCEDURE — 96372 THER/PROPH/DIAG INJ SC/IM: CPT

## 2022-01-19 RX ORDER — ACETAMINOPHEN 325 MG/1
650 TABLET ORAL AS NEEDED
Status: CANCELLED
Start: 2022-02-13

## 2022-01-19 RX ORDER — DIPHENHYDRAMINE HYDROCHLORIDE 50 MG/ML
25 INJECTION, SOLUTION INTRAMUSCULAR; INTRAVENOUS AS NEEDED
Status: CANCELLED
Start: 2022-01-19

## 2022-01-19 RX ORDER — ONDANSETRON 2 MG/ML
8 INJECTION INTRAMUSCULAR; INTRAVENOUS AS NEEDED
Status: CANCELLED | OUTPATIENT
Start: 2022-02-13

## 2022-01-19 RX ORDER — DIPHENHYDRAMINE HYDROCHLORIDE 50 MG/ML
50 INJECTION, SOLUTION INTRAMUSCULAR; INTRAVENOUS AS NEEDED
Status: CANCELLED
Start: 2022-02-13

## 2022-01-19 RX ORDER — HYDROCORTISONE SODIUM SUCCINATE 100 MG/2ML
100 INJECTION, POWDER, FOR SOLUTION INTRAMUSCULAR; INTRAVENOUS AS NEEDED
Status: CANCELLED | OUTPATIENT
Start: 2022-01-19

## 2022-01-19 RX ORDER — DIPHENHYDRAMINE HYDROCHLORIDE 50 MG/ML
25 INJECTION, SOLUTION INTRAMUSCULAR; INTRAVENOUS AS NEEDED
Status: CANCELLED
Start: 2022-02-13

## 2022-01-19 RX ORDER — ACETAMINOPHEN 325 MG/1
650 TABLET ORAL AS NEEDED
Status: CANCELLED
Start: 2022-01-19

## 2022-01-19 RX ORDER — EPINEPHRINE 1 MG/ML
0.3 INJECTION, SOLUTION, CONCENTRATE INTRAVENOUS AS NEEDED
Status: CANCELLED | OUTPATIENT
Start: 2022-01-19

## 2022-01-19 RX ORDER — ALBUTEROL SULFATE 0.83 MG/ML
2.5 SOLUTION RESPIRATORY (INHALATION) AS NEEDED
Status: CANCELLED
Start: 2022-01-19

## 2022-01-19 RX ORDER — ALBUTEROL SULFATE 0.83 MG/ML
2.5 SOLUTION RESPIRATORY (INHALATION) AS NEEDED
Status: CANCELLED
Start: 2022-02-13

## 2022-01-19 RX ORDER — DIPHENHYDRAMINE HYDROCHLORIDE 50 MG/ML
50 INJECTION, SOLUTION INTRAMUSCULAR; INTRAVENOUS AS NEEDED
Status: CANCELLED
Start: 2022-01-19

## 2022-01-19 RX ORDER — EPINEPHRINE 1 MG/ML
0.3 INJECTION, SOLUTION, CONCENTRATE INTRAVENOUS AS NEEDED
Status: CANCELLED | OUTPATIENT
Start: 2022-02-13

## 2022-01-19 RX ORDER — ONDANSETRON 2 MG/ML
8 INJECTION INTRAMUSCULAR; INTRAVENOUS AS NEEDED
Status: CANCELLED | OUTPATIENT
Start: 2022-01-19

## 2022-01-19 RX ORDER — HYDROCORTISONE SODIUM SUCCINATE 100 MG/2ML
100 INJECTION, POWDER, FOR SOLUTION INTRAMUSCULAR; INTRAVENOUS AS NEEDED
Status: CANCELLED | OUTPATIENT
Start: 2022-02-13

## 2022-01-19 RX ADMIN — MEPOLIZUMAB 100 MG: 100 INJECTION, POWDER, FOR SOLUTION SUBCUTANEOUS at 14:41

## 2022-01-19 NOTE — PROGRESS NOTES
[facility] Progress Note    Date: 2022    Name: Taylor Johnson    MRN: [id number]         : 1945      Nucala Injection. Ms. Karina Lees was assessed and education was provided. Ms. Ritter's vitals were reviewed and patient was observed for 5 minutes prior to treatment. Visit Vitals  /66 (BP 1 Location: Right arm)   Pulse 73   Temp 97.6 °F (36.4 °C)   Resp 18   SpO2 96%   Breastfeeding No       Nucala 100 mg  was administered subcutaneously to the back of patient's left arm. Band aid applied to site. Patient remained in [facility] for 20 for observation and was found to have no allergic reactions. Ms. Karina Lees tolerated well, and had no complaints. Patient armband removed and shredded. Ms. Karina Lees was discharged from [place] [address] in stable condition at 1520. She is to return on 22 at 1400 for her next appointment.     Juan Stuart  2022  1:45 PM

## 2022-02-23 ENCOUNTER — HOSPITAL ENCOUNTER (OUTPATIENT)
Dept: INFUSION THERAPY | Age: 77
Discharge: HOME | End: 2022-02-23
Attending: INTERNAL MEDICINE
Payer: MEDICARE

## 2022-02-23 VITALS
OXYGEN SATURATION: 97 % | DIASTOLIC BLOOD PRESSURE: 76 MMHG | TEMPERATURE: 97.9 F | SYSTOLIC BLOOD PRESSURE: 136 MMHG | HEART RATE: 74 BPM | RESPIRATION RATE: 18 BRPM

## 2022-02-23 DIAGNOSIS — J45.50 SEVERE PERSISTENT ASTHMA, UNSPECIFIED WHETHER COMPLICATED: Primary | ICD-10-CM

## 2022-02-23 PROCEDURE — 96372 THER/PROPH/DIAG INJ SC/IM: CPT

## 2022-02-23 PROCEDURE — 74011250636 HC RX REV CODE- 250/636: Performed by: INTERNAL MEDICINE

## 2022-02-23 RX ORDER — HYDROCORTISONE SODIUM SUCCINATE 100 MG/2ML
100 INJECTION, POWDER, FOR SOLUTION INTRAMUSCULAR; INTRAVENOUS AS NEEDED
Status: CANCELLED | OUTPATIENT
Start: 2022-03-01

## 2022-02-23 RX ORDER — ONDANSETRON 2 MG/ML
8 INJECTION INTRAMUSCULAR; INTRAVENOUS AS NEEDED
Status: CANCELLED | OUTPATIENT
Start: 2022-03-01

## 2022-02-23 RX ORDER — EPINEPHRINE 1 MG/ML
0.3 INJECTION, SOLUTION, CONCENTRATE INTRAVENOUS AS NEEDED
Status: CANCELLED | OUTPATIENT
Start: 2022-03-01

## 2022-02-23 RX ORDER — ACETAMINOPHEN 325 MG/1
650 TABLET ORAL AS NEEDED
Status: CANCELLED
Start: 2022-03-01

## 2022-02-23 RX ORDER — ALBUTEROL SULFATE 0.83 MG/ML
2.5 SOLUTION RESPIRATORY (INHALATION) AS NEEDED
Status: CANCELLED
Start: 2022-03-01

## 2022-02-23 RX ORDER — DIPHENHYDRAMINE HYDROCHLORIDE 50 MG/ML
50 INJECTION, SOLUTION INTRAMUSCULAR; INTRAVENOUS AS NEEDED
Status: CANCELLED
Start: 2022-03-01

## 2022-02-23 RX ORDER — DIPHENHYDRAMINE HYDROCHLORIDE 50 MG/ML
25 INJECTION, SOLUTION INTRAMUSCULAR; INTRAVENOUS AS NEEDED
Status: CANCELLED
Start: 2022-03-01

## 2022-02-23 RX ADMIN — MEPOLIZUMAB 100 MG: 100 INJECTION, POWDER, FOR SOLUTION SUBCUTANEOUS at 13:28

## 2022-02-23 NOTE — PROGRESS NOTES
[facility] Progress Note    Date: 2022    Name: Yessica Aquino    MRN: [id number]         : 1945      Nucala 100 mg injection every 28 days      Ms. Ritter was assessed and education was provided. Denies any problems with previous injection. Ms. Ritter's vitals were reviewed and patient was observed for 5 minutes prior to treatment. Visit Vitals  /76 (BP 1 Location: Right arm)   Pulse 74   Temp 97.9 °F (36.6 °C)   Resp 18   SpO2 97%     Took patients blood pressure manually, because she was concern about the elevation in the automatic. Nucala 100 mg/1 ml was administered subcutaneously in back of upper left arm. No irritation or drainage noted from site, band aid applied. Observed for 30 minutes, no s/s reaction noted. .       Ms. Ritter tolerated well, and had no complaints. Patient armband removed and shredded. Ms. Catrachito Bates was discharged from [place] [address] in stable condition at 1440. She is to return on 3/24/2022 at 1400 for her next appointment for Nucala injeciton.     Kvng Mendez RN  2022

## 2022-03-19 PROBLEM — J82.83 EOSINOPHILIC ASTHMA: Status: ACTIVE | Noted: 2019-10-30

## 2022-03-19 PROBLEM — J45.50 SEVERE PERSISTENT ASTHMA: Status: ACTIVE | Noted: 2022-01-19

## 2022-03-24 ENCOUNTER — HOSPITAL ENCOUNTER (OUTPATIENT)
Dept: INFUSION THERAPY | Age: 77
End: 2022-03-24
Attending: INTERNAL MEDICINE
Payer: MEDICARE

## 2022-03-31 ENCOUNTER — HOSPITAL ENCOUNTER (OUTPATIENT)
Dept: INFUSION THERAPY | Age: 77
Discharge: HOME | End: 2022-03-31
Attending: INTERNAL MEDICINE
Payer: MEDICARE

## 2022-03-31 VITALS
SYSTOLIC BLOOD PRESSURE: 125 MMHG | HEART RATE: 77 BPM | TEMPERATURE: 98.5 F | RESPIRATION RATE: 16 BRPM | OXYGEN SATURATION: 97 % | DIASTOLIC BLOOD PRESSURE: 77 MMHG

## 2022-03-31 DIAGNOSIS — J45.50 SEVERE PERSISTENT ASTHMA, UNSPECIFIED WHETHER COMPLICATED: Primary | ICD-10-CM

## 2022-03-31 PROCEDURE — 74011250636 HC RX REV CODE- 250/636: Performed by: INTERNAL MEDICINE

## 2022-03-31 PROCEDURE — 96372 THER/PROPH/DIAG INJ SC/IM: CPT

## 2022-03-31 RX ORDER — DIPHENHYDRAMINE HYDROCHLORIDE 50 MG/ML
25 INJECTION, SOLUTION INTRAMUSCULAR; INTRAVENOUS AS NEEDED
Status: CANCELLED
Start: 2022-04-17

## 2022-03-31 RX ORDER — DIPHENHYDRAMINE HYDROCHLORIDE 50 MG/ML
50 INJECTION, SOLUTION INTRAMUSCULAR; INTRAVENOUS AS NEEDED
Status: CANCELLED
Start: 2022-04-17

## 2022-03-31 RX ORDER — ACETAMINOPHEN 325 MG/1
650 TABLET ORAL AS NEEDED
Status: CANCELLED
Start: 2022-04-17

## 2022-03-31 RX ORDER — EPINEPHRINE 1 MG/ML
0.3 INJECTION, SOLUTION, CONCENTRATE INTRAVENOUS AS NEEDED
Status: CANCELLED | OUTPATIENT
Start: 2022-04-17

## 2022-03-31 RX ORDER — ONDANSETRON 2 MG/ML
8 INJECTION INTRAMUSCULAR; INTRAVENOUS AS NEEDED
Status: CANCELLED | OUTPATIENT
Start: 2022-04-17

## 2022-03-31 RX ORDER — HYDROCORTISONE SODIUM SUCCINATE 100 MG/2ML
100 INJECTION, POWDER, FOR SOLUTION INTRAMUSCULAR; INTRAVENOUS AS NEEDED
Status: CANCELLED | OUTPATIENT
Start: 2022-04-17

## 2022-03-31 RX ORDER — ALBUTEROL SULFATE 0.83 MG/ML
2.5 SOLUTION RESPIRATORY (INHALATION) AS NEEDED
Status: CANCELLED
Start: 2022-04-17

## 2022-03-31 RX ADMIN — MEPOLIZUMAB 100 MG: 100 INJECTION, POWDER, FOR SOLUTION SUBCUTANEOUS at 14:55

## 2022-03-31 NOTE — PROGRESS NOTES
[facility] Progress Note    Date: 2022    Name: Jovi Fenton    MRN: [id number]         : 1945      Nucala 100 mg injection every 28 days      Ms. Ritter was assessed and education was provided. Denies any problems with previous injection. Ms. Ritter's vitals were reviewed and patient was observed for 5 minutes prior to treatment. Visit Vitals  /77 (BP 1 Location: Right arm)   Pulse 77   Temp 98.5 °F (36.9 °C)   Resp 16   SpO2 97%   Breastfeeding No     Nucala 100 mg/1 ml was administered subcutaneously in back of upper right arm. No irritation or drainage noted from site, band aid applied. Observed for 30 minutes, no s/s reaction noted. .       Ms. Ritter tolerated well, and had no complaints. Patient armband removed and shredded. Ms. Reena Gong was discharged from [place] [address] in stable condition at 1510. She is to return on 2022 at 1430 for her next appointment for Nucala injeciton.     Vin Higgins RN  2022

## 2022-04-27 ENCOUNTER — APPOINTMENT (OUTPATIENT)
Dept: INFUSION THERAPY | Age: 77
End: 2022-04-27
Attending: INTERNAL MEDICINE
Payer: MEDICARE

## 2022-05-04 ENCOUNTER — HOSPITAL ENCOUNTER (OUTPATIENT)
Dept: INFUSION THERAPY | Age: 77
Discharge: HOME | End: 2022-05-04
Attending: INTERNAL MEDICINE
Payer: MEDICARE

## 2022-05-04 VITALS
HEART RATE: 76 BPM | TEMPERATURE: 97.8 F | OXYGEN SATURATION: 98 % | SYSTOLIC BLOOD PRESSURE: 126 MMHG | RESPIRATION RATE: 16 BRPM | DIASTOLIC BLOOD PRESSURE: 82 MMHG

## 2022-05-04 DIAGNOSIS — J45.50 SEVERE PERSISTENT ASTHMA, UNSPECIFIED WHETHER COMPLICATED: Primary | ICD-10-CM

## 2022-05-04 PROCEDURE — 96372 THER/PROPH/DIAG INJ SC/IM: CPT

## 2022-05-04 PROCEDURE — 74011250636 HC RX REV CODE- 250/636: Performed by: INTERNAL MEDICINE

## 2022-05-04 RX ORDER — DIPHENHYDRAMINE HYDROCHLORIDE 50 MG/ML
50 INJECTION, SOLUTION INTRAMUSCULAR; INTRAVENOUS AS NEEDED
Status: CANCELLED
Start: 2022-05-15

## 2022-05-04 RX ORDER — ALBUTEROL SULFATE 0.83 MG/ML
2.5 SOLUTION RESPIRATORY (INHALATION) AS NEEDED
Status: CANCELLED
Start: 2022-05-15

## 2022-05-04 RX ORDER — ACETAMINOPHEN 325 MG/1
650 TABLET ORAL AS NEEDED
Status: CANCELLED
Start: 2022-05-15

## 2022-05-04 RX ORDER — EPINEPHRINE 1 MG/ML
0.3 INJECTION, SOLUTION, CONCENTRATE INTRAVENOUS AS NEEDED
Status: CANCELLED | OUTPATIENT
Start: 2022-05-15

## 2022-05-04 RX ORDER — ONDANSETRON 2 MG/ML
8 INJECTION INTRAMUSCULAR; INTRAVENOUS AS NEEDED
Status: CANCELLED | OUTPATIENT
Start: 2022-05-15

## 2022-05-04 RX ORDER — DIPHENHYDRAMINE HYDROCHLORIDE 50 MG/ML
25 INJECTION, SOLUTION INTRAMUSCULAR; INTRAVENOUS AS NEEDED
Status: CANCELLED
Start: 2022-05-15

## 2022-05-04 RX ORDER — HYDROCORTISONE SODIUM SUCCINATE 100 MG/2ML
100 INJECTION, POWDER, FOR SOLUTION INTRAMUSCULAR; INTRAVENOUS AS NEEDED
Status: CANCELLED | OUTPATIENT
Start: 2022-05-15

## 2022-05-04 RX ADMIN — MEPOLIZUMAB 100 MG: 100 INJECTION, POWDER, FOR SOLUTION SUBCUTANEOUS at 10:51

## 2022-05-04 NOTE — PROGRESS NOTES
[facility] Progress Note    Date: May 4, 2022    Name: Faiza Gautam    MRN: [id number]         : 1945      Nucala 100 mg injection every 28 days      Ms. Ritter was assessed and education was provided. Denies any problems with previous injection. Ms. Ritter's vitals were reviewed and patient was observed for 5 minutes prior to treatment. Visit Vitals  /82 (BP 1 Location: Right arm)   Pulse 76   Temp 97.8 °F (36.6 °C)   Resp 16   SpO2 98%     Took patients blood pressure manually, because she was concern about the elevation in the automatic. Nucala 100 mg/1 ml was administered subcutaneously in back of upper right arm. No irritation or drainage noted from site, band aid applied. Observed for 30 minutes, no s/s reaction noted. .       Ms. Ritter tolerated well, and had no complaints. Patient armband removed and shredded. Ms. Dominguez Jacques was discharged from [place] [address] in stable condition at 1115 She is to return on 2022 for her next appointment for Nucala injeciton.     Florian Abrams RN  May 4, 2022

## 2022-06-01 ENCOUNTER — HOSPITAL ENCOUNTER (OUTPATIENT)
Dept: INFUSION THERAPY | Age: 77
Discharge: HOME | End: 2022-06-01
Attending: INTERNAL MEDICINE
Payer: MEDICARE

## 2022-06-01 VITALS
TEMPERATURE: 98 F | RESPIRATION RATE: 20 BRPM | HEART RATE: 78 BPM | DIASTOLIC BLOOD PRESSURE: 68 MMHG | SYSTOLIC BLOOD PRESSURE: 118 MMHG | OXYGEN SATURATION: 97 %

## 2022-06-01 DIAGNOSIS — J45.50 SEVERE PERSISTENT ASTHMA, UNSPECIFIED WHETHER COMPLICATED: Primary | ICD-10-CM

## 2022-06-01 PROCEDURE — 74011250636 HC RX REV CODE- 250/636: Performed by: INTERNAL MEDICINE

## 2022-06-01 PROCEDURE — 96372 THER/PROPH/DIAG INJ SC/IM: CPT

## 2022-06-01 RX ORDER — WATER FOR INJECTION,STERILE
VIAL (ML) INJECTION
Status: DISCONTINUED
Start: 2022-06-01 | End: 2022-06-02 | Stop reason: HOSPADM

## 2022-06-01 RX ORDER — ACETAMINOPHEN 325 MG/1
650 TABLET ORAL AS NEEDED
Status: CANCELLED
Start: 2022-06-15

## 2022-06-01 RX ORDER — DIPHENHYDRAMINE HYDROCHLORIDE 50 MG/ML
25 INJECTION, SOLUTION INTRAMUSCULAR; INTRAVENOUS AS NEEDED
Status: CANCELLED
Start: 2022-06-15

## 2022-06-01 RX ORDER — ALBUTEROL SULFATE 0.83 MG/ML
2.5 SOLUTION RESPIRATORY (INHALATION) AS NEEDED
Status: CANCELLED
Start: 2022-06-15

## 2022-06-01 RX ORDER — HYDROCORTISONE SODIUM SUCCINATE 100 MG/2ML
100 INJECTION, POWDER, FOR SOLUTION INTRAMUSCULAR; INTRAVENOUS AS NEEDED
Status: CANCELLED | OUTPATIENT
Start: 2022-06-15

## 2022-06-01 RX ORDER — EPINEPHRINE 1 MG/ML
0.3 INJECTION, SOLUTION, CONCENTRATE INTRAVENOUS AS NEEDED
Status: CANCELLED | OUTPATIENT
Start: 2022-06-15

## 2022-06-01 RX ORDER — ONDANSETRON 2 MG/ML
8 INJECTION INTRAMUSCULAR; INTRAVENOUS AS NEEDED
Status: CANCELLED | OUTPATIENT
Start: 2022-06-15

## 2022-06-01 RX ORDER — DIPHENHYDRAMINE HYDROCHLORIDE 50 MG/ML
50 INJECTION, SOLUTION INTRAMUSCULAR; INTRAVENOUS AS NEEDED
Status: CANCELLED
Start: 2022-06-15

## 2022-06-01 RX ADMIN — MEPOLIZUMAB 100 MG: 100 INJECTION, POWDER, FOR SOLUTION SUBCUTANEOUS at 14:17

## 2022-06-01 NOTE — PROGRESS NOTES
[facility] Progress Note    Date: 2022    Name: Bakari Cuadra    MRN: [id number]         : 1945      Nucala 100 mg injection every 28 days      Ms. Ritter was assessed and education was provided. Denies any problems with previous injection. Ms. Ritter's vitals were reviewed and patient was observed for 5 minutes prior to treatment. Visit Vitals  /68 (BP 1 Location: Right arm)   Pulse 78   Temp 98 °F (36.7 °C)   Resp 20   SpO2 97%     Took patients blood pressure manually, because she was concern about the elevation in the automatic. Nucala 100 mg/1 ml was administered subcutaneously in back of upper right arm. No irritation or drainage noted from site, band aid applied. Observed for 30 minutes, no s/s reaction noted. .       Ms. Ritter tolerated well, and had no complaints. Patient armband removed and shredded. Ms. Gilbert Medel was discharged from [place] [address] in stable condition at 1440 She is to return on 2022 for her next appointment for Nucala injeciton.     Danette Rebollar RN  2022

## 2022-06-29 ENCOUNTER — HOSPITAL ENCOUNTER (OUTPATIENT)
Dept: INFUSION THERAPY | Age: 77
Discharge: HOME | End: 2022-06-29
Attending: INTERNAL MEDICINE
Payer: MEDICARE

## 2022-06-29 VITALS
DIASTOLIC BLOOD PRESSURE: 78 MMHG | TEMPERATURE: 98.3 F | OXYGEN SATURATION: 98 % | RESPIRATION RATE: 16 BRPM | SYSTOLIC BLOOD PRESSURE: 138 MMHG | HEART RATE: 82 BPM

## 2022-06-29 DIAGNOSIS — J45.50 SEVERE PERSISTENT ASTHMA, UNSPECIFIED WHETHER COMPLICATED: Primary | ICD-10-CM

## 2022-06-29 PROCEDURE — 96372 THER/PROPH/DIAG INJ SC/IM: CPT

## 2022-06-29 PROCEDURE — 74011250636 HC RX REV CODE- 250/636: Performed by: INTERNAL MEDICINE

## 2022-06-29 RX ORDER — HYDROCORTISONE SODIUM SUCCINATE 100 MG/2ML
100 INJECTION, POWDER, FOR SOLUTION INTRAMUSCULAR; INTRAVENOUS AS NEEDED
Status: CANCELLED | OUTPATIENT
Start: 2022-07-13

## 2022-06-29 RX ORDER — ACETAMINOPHEN 325 MG/1
650 TABLET ORAL AS NEEDED
Status: CANCELLED
Start: 2022-07-13

## 2022-06-29 RX ORDER — ALBUTEROL SULFATE 0.83 MG/ML
2.5 SOLUTION RESPIRATORY (INHALATION) AS NEEDED
Status: CANCELLED
Start: 2022-07-13

## 2022-06-29 RX ORDER — EPINEPHRINE 1 MG/ML
0.3 INJECTION, SOLUTION, CONCENTRATE INTRAVENOUS AS NEEDED
Status: CANCELLED | OUTPATIENT
Start: 2022-07-13

## 2022-06-29 RX ORDER — DIPHENHYDRAMINE HYDROCHLORIDE 50 MG/ML
25 INJECTION, SOLUTION INTRAMUSCULAR; INTRAVENOUS AS NEEDED
Status: CANCELLED
Start: 2022-07-13

## 2022-06-29 RX ORDER — DIPHENHYDRAMINE HYDROCHLORIDE 50 MG/ML
50 INJECTION, SOLUTION INTRAMUSCULAR; INTRAVENOUS AS NEEDED
Status: CANCELLED
Start: 2022-07-13

## 2022-06-29 RX ORDER — ONDANSETRON 2 MG/ML
8 INJECTION INTRAMUSCULAR; INTRAVENOUS AS NEEDED
Status: CANCELLED | OUTPATIENT
Start: 2022-07-13

## 2022-06-29 RX ADMIN — MEPOLIZUMAB 100 MG: 100 INJECTION, POWDER, FOR SOLUTION SUBCUTANEOUS at 14:15

## 2022-06-29 NOTE — PROGRESS NOTES
[facility] Progress Note    Date: 2022    Name: Jevon Avila    MRN: [id number]         : 1945      Nucala 100 mg injection every 28 days      Ms. Ritter was assessed and education was provided. Denies any problems with previous injection. Ms. Ritter's vitals were reviewed and patient was observed for 5 minutes prior to treatment. Visit Vitals  /78 (BP 1 Location: Right arm)   Pulse 82   Temp 98.3 °F (36.8 °C)   Resp 16   SpO2 98%     Took patients blood pressure manually, because she was concern about the elevation in the automatic. Nucala 100 mg/1 ml was administered subcutaneously in back of upper right arm. No irritation or drainage noted from site, band aid applied. Observed for 30 minutes, no s/s reaction noted. .       Ms. Ritter tolerated well, and had no complaints. Patient armband removed and shredded. Ms. Liliane Cohen was discharged from [place] [address] in stable condition at 1425 She is to return on  for her next appointment for Nucala injeciton.     Carlos A Walker RN  2022

## 2022-07-27 ENCOUNTER — APPOINTMENT (OUTPATIENT)
Dept: INFUSION THERAPY | Age: 77
End: 2022-07-27
Attending: INTERNAL MEDICINE
Payer: MEDICARE

## 2022-07-28 ENCOUNTER — HOSPITAL ENCOUNTER (OUTPATIENT)
Dept: INFUSION THERAPY | Age: 77
Discharge: HOME | End: 2022-07-28
Attending: INTERNAL MEDICINE
Payer: MEDICARE

## 2022-07-28 VITALS
SYSTOLIC BLOOD PRESSURE: 128 MMHG | TEMPERATURE: 98.4 F | DIASTOLIC BLOOD PRESSURE: 68 MMHG | RESPIRATION RATE: 16 BRPM | OXYGEN SATURATION: 98 % | HEART RATE: 80 BPM

## 2022-07-28 DIAGNOSIS — J45.50 SEVERE PERSISTENT ASTHMA, UNSPECIFIED WHETHER COMPLICATED: Primary | ICD-10-CM

## 2022-07-28 PROCEDURE — 96372 THER/PROPH/DIAG INJ SC/IM: CPT

## 2022-07-28 PROCEDURE — 74011250636 HC RX REV CODE- 250/636: Performed by: INTERNAL MEDICINE

## 2022-07-28 RX ORDER — ACETAMINOPHEN 325 MG/1
650 TABLET ORAL AS NEEDED
Status: CANCELLED
Start: 2022-08-07

## 2022-07-28 RX ORDER — HYDROCORTISONE SODIUM SUCCINATE 100 MG/2ML
100 INJECTION, POWDER, FOR SOLUTION INTRAMUSCULAR; INTRAVENOUS AS NEEDED
Status: CANCELLED | OUTPATIENT
Start: 2022-08-07

## 2022-07-28 RX ORDER — ALBUTEROL SULFATE 0.83 MG/ML
2.5 SOLUTION RESPIRATORY (INHALATION) AS NEEDED
Status: CANCELLED
Start: 2022-08-07

## 2022-07-28 RX ORDER — EPINEPHRINE 1 MG/ML
0.3 INJECTION, SOLUTION, CONCENTRATE INTRAVENOUS AS NEEDED
Status: CANCELLED | OUTPATIENT
Start: 2022-08-07

## 2022-07-28 RX ORDER — DIPHENHYDRAMINE HYDROCHLORIDE 50 MG/ML
25 INJECTION, SOLUTION INTRAMUSCULAR; INTRAVENOUS AS NEEDED
Status: CANCELLED
Start: 2022-08-07

## 2022-07-28 RX ORDER — DIPHENHYDRAMINE HYDROCHLORIDE 50 MG/ML
50 INJECTION, SOLUTION INTRAMUSCULAR; INTRAVENOUS AS NEEDED
Status: CANCELLED
Start: 2022-08-07

## 2022-07-28 RX ORDER — ONDANSETRON 2 MG/ML
8 INJECTION INTRAMUSCULAR; INTRAVENOUS AS NEEDED
Status: CANCELLED | OUTPATIENT
Start: 2022-08-07

## 2022-07-28 RX ADMIN — MEPOLIZUMAB 100 MG: 100 INJECTION, POWDER, FOR SOLUTION SUBCUTANEOUS at 14:26

## 2022-07-28 NOTE — PROGRESS NOTES
[facility] Progress Note    Date: 2022    Name: Patti Crowder    MRN: [id number]         : 1945      Nucala 100 mg injection every 28 days      Ms. Ritter was assessed and education was provided. Denies any problems with previous injection. Ms. Ritter's vitals were reviewed and patient was observed for 5 minutes prior to treatment. Visit Vitals  /68 (BP 1 Location: Right arm)   Pulse 80   Temp 98.4 °F (36.9 °C)   Resp 16   SpO2 98%     Took patients blood pressure manually, because she was concern about the elevation in the automatic. Nucala 100 mg/1 ml was administered subcutaneously in back of upper left arm. No irritation or drainage noted from site, band aid applied. Observed for 30 minutes, no s/s reaction noted. .       Ms. Ritter tolerated well, and had no complaints. Patient armband removed and shredded. Ms. Tabitha Larson was discharged from [place] [address] in stable condition at 1435 She is to return on 2022 @ 1400 for her next appointment for Nucala injeciton.     Bridgett Champagne RN  2022

## 2022-08-25 ENCOUNTER — HOSPITAL ENCOUNTER (OUTPATIENT)
Dept: INFUSION THERAPY | Age: 77
Discharge: HOME | End: 2022-08-25
Attending: INTERNAL MEDICINE
Payer: MEDICARE

## 2022-08-25 VITALS
DIASTOLIC BLOOD PRESSURE: 74 MMHG | TEMPERATURE: 98.1 F | HEART RATE: 75 BPM | OXYGEN SATURATION: 98 % | SYSTOLIC BLOOD PRESSURE: 126 MMHG | RESPIRATION RATE: 16 BRPM

## 2022-08-25 DIAGNOSIS — J45.50 SEVERE PERSISTENT ASTHMA, UNSPECIFIED WHETHER COMPLICATED: Primary | ICD-10-CM

## 2022-08-25 PROCEDURE — 96372 THER/PROPH/DIAG INJ SC/IM: CPT

## 2022-08-25 PROCEDURE — 96374 THER/PROPH/DIAG INJ IV PUSH: CPT

## 2022-08-25 PROCEDURE — 74011250636 HC RX REV CODE- 250/636: Performed by: INTERNAL MEDICINE

## 2022-08-25 RX ORDER — ALBUTEROL SULFATE 0.83 MG/ML
2.5 SOLUTION RESPIRATORY (INHALATION) AS NEEDED
Status: CANCELLED
Start: 2022-09-08

## 2022-08-25 RX ORDER — HYDROCORTISONE SODIUM SUCCINATE 100 MG/2ML
100 INJECTION, POWDER, FOR SOLUTION INTRAMUSCULAR; INTRAVENOUS AS NEEDED
Status: CANCELLED | OUTPATIENT
Start: 2022-09-08

## 2022-08-25 RX ORDER — DIPHENHYDRAMINE HYDROCHLORIDE 50 MG/ML
25 INJECTION, SOLUTION INTRAMUSCULAR; INTRAVENOUS AS NEEDED
Status: CANCELLED
Start: 2022-09-08

## 2022-08-25 RX ORDER — ACETAMINOPHEN 325 MG/1
650 TABLET ORAL AS NEEDED
Status: CANCELLED
Start: 2022-09-08

## 2022-08-25 RX ORDER — EPINEPHRINE 1 MG/ML
0.3 INJECTION, SOLUTION, CONCENTRATE INTRAVENOUS AS NEEDED
Status: CANCELLED | OUTPATIENT
Start: 2022-09-08

## 2022-08-25 RX ORDER — DIPHENHYDRAMINE HYDROCHLORIDE 50 MG/ML
50 INJECTION, SOLUTION INTRAMUSCULAR; INTRAVENOUS AS NEEDED
Status: CANCELLED
Start: 2022-09-08

## 2022-08-25 RX ORDER — ONDANSETRON 2 MG/ML
8 INJECTION INTRAMUSCULAR; INTRAVENOUS AS NEEDED
Status: CANCELLED | OUTPATIENT
Start: 2022-09-08

## 2022-08-25 RX ADMIN — MEPOLIZUMAB 100 MG: 100 INJECTION, POWDER, FOR SOLUTION SUBCUTANEOUS at 14:32

## 2022-08-25 NOTE — PROGRESS NOTES
[facility] Progress Note    Date: 2022    Name: Maulik Mares    MRN: [id number]         : 1945      Nucala 100 mg injection every 28 days      Ms. Ritter was assessed and education was provided. Denies any problems with previous injection. Ms. Ritter's vitals were reviewed and patient was observed for 5 minutes prior to treatment. Visit Vitals  /74 (BP 1 Location: Right arm)   Pulse 75   Temp 98.1 °F (36.7 °C)   Resp 16   SpO2 98%     Took patients blood pressure manually, because she was concern about the elevation in the automatic. Nucala 100 mg/1 ml was administered subcutaneously in back of upper right arm. No irritation or drainage noted from site, band aid applied. Observed for approximately 20 minutes, no s/s reaction noted. .       Ms. Ritter tolerated well, and had no complaints. Patient armband removed and shredded. Ms. Sherri Keith was discharged from [place] [address] in stable condition at 1450. She is to return on 2022 @ 1400 for her next appointment for Nucala injeciton.     Julian Niño RN  2022

## 2022-09-22 ENCOUNTER — APPOINTMENT (OUTPATIENT)
Dept: INFUSION THERAPY | Age: 77
End: 2022-09-22
Attending: INTERNAL MEDICINE
Payer: MEDICARE

## 2022-09-29 ENCOUNTER — HOSPITAL ENCOUNTER (OUTPATIENT)
Dept: INFUSION THERAPY | Age: 77
Discharge: HOME | End: 2022-09-29
Attending: INTERNAL MEDICINE
Payer: MEDICARE

## 2022-09-29 VITALS
HEART RATE: 79 BPM | OXYGEN SATURATION: 99 % | DIASTOLIC BLOOD PRESSURE: 66 MMHG | SYSTOLIC BLOOD PRESSURE: 128 MMHG | TEMPERATURE: 98.1 F | RESPIRATION RATE: 16 BRPM

## 2022-09-29 DIAGNOSIS — J45.50 SEVERE PERSISTENT ASTHMA, UNSPECIFIED WHETHER COMPLICATED: Primary | ICD-10-CM

## 2022-09-29 PROCEDURE — 96372 THER/PROPH/DIAG INJ SC/IM: CPT

## 2022-09-29 PROCEDURE — 74011250636 HC RX REV CODE- 250/636: Performed by: INTERNAL MEDICINE

## 2022-09-29 RX ORDER — ACETAMINOPHEN 325 MG/1
650 TABLET ORAL AS NEEDED
Status: CANCELLED
Start: 2022-10-02

## 2022-09-29 RX ORDER — EPINEPHRINE 1 MG/ML
0.3 INJECTION, SOLUTION, CONCENTRATE INTRAVENOUS AS NEEDED
Status: CANCELLED | OUTPATIENT
Start: 2022-10-02

## 2022-09-29 RX ORDER — HYDROCORTISONE SODIUM SUCCINATE 100 MG/2ML
100 INJECTION, POWDER, FOR SOLUTION INTRAMUSCULAR; INTRAVENOUS AS NEEDED
Status: CANCELLED | OUTPATIENT
Start: 2022-10-02

## 2022-09-29 RX ORDER — DIPHENHYDRAMINE HYDROCHLORIDE 50 MG/ML
50 INJECTION, SOLUTION INTRAMUSCULAR; INTRAVENOUS AS NEEDED
Status: CANCELLED
Start: 2022-10-02

## 2022-09-29 RX ORDER — DIPHENHYDRAMINE HYDROCHLORIDE 50 MG/ML
25 INJECTION, SOLUTION INTRAMUSCULAR; INTRAVENOUS AS NEEDED
Status: CANCELLED
Start: 2022-10-02

## 2022-09-29 RX ORDER — ALBUTEROL SULFATE 0.83 MG/ML
2.5 SOLUTION RESPIRATORY (INHALATION) AS NEEDED
Status: CANCELLED
Start: 2022-10-02

## 2022-09-29 RX ORDER — ONDANSETRON 2 MG/ML
8 INJECTION INTRAMUSCULAR; INTRAVENOUS AS NEEDED
Status: CANCELLED | OUTPATIENT
Start: 2022-10-02

## 2022-09-29 RX ADMIN — MEPOLIZUMAB 100 MG: 100 INJECTION, POWDER, FOR SOLUTION SUBCUTANEOUS at 14:38

## 2022-09-29 NOTE — PROGRESS NOTES
[facility] Progress Note    Date: 2022    Name: Sarah Ralph    MRN: [id number]         : 1945      Nucala 100 mg injection every 28 days      Ms. Ritter was assessed and education was provided. Denies any problems with previous injection. Ms. Ritter's vitals were reviewed and patient was observed for 5 minutes prior to treatment. Visit Vitals  /66 (BP 1 Location: Right arm)   Pulse 79   Temp 98.1 °F (36.7 °C)   Resp 16   SpO2 99%     Took patients blood pressure manually, because she was concern about the elevation in the automatic. Used lower right arm. Nucala 100 mg/1 ml was administered subcutaneously in back of upper left arm. No irritation or drainage noted from site, band aid applied. Observed for 30 minutes, no s/s reaction noted. .       Ms. Ritter tolerated well, and had no complaints. Patient armband removed and shredded. Pt recently had steroid injections in knee for discomfort, request to be observed for 30 minutes post injection. Tolerated without any complications or reactions. Ms. Dixie Moulton was discharged from [place] [address] in stable condition at 1510. She is to return on 10/28//2022 @ 1400 for her next appointment for Nucala injeciton.     Anna Shields RN  2022

## 2022-10-28 ENCOUNTER — HOSPITAL ENCOUNTER (OUTPATIENT)
Dept: INFUSION THERAPY | Age: 77
Discharge: HOME | End: 2022-10-28
Attending: INTERNAL MEDICINE
Payer: MEDICARE

## 2022-10-28 VITALS
OXYGEN SATURATION: 99 % | DIASTOLIC BLOOD PRESSURE: 72 MMHG | RESPIRATION RATE: 16 BRPM | HEART RATE: 81 BPM | SYSTOLIC BLOOD PRESSURE: 138 MMHG | TEMPERATURE: 98.3 F

## 2022-10-28 DIAGNOSIS — J45.50 SEVERE PERSISTENT ASTHMA, UNSPECIFIED WHETHER COMPLICATED: Primary | ICD-10-CM

## 2022-10-28 PROCEDURE — 74011250636 HC RX REV CODE- 250/636: Performed by: INTERNAL MEDICINE

## 2022-10-28 PROCEDURE — 96372 THER/PROPH/DIAG INJ SC/IM: CPT

## 2022-10-28 RX ORDER — EPINEPHRINE 1 MG/ML
0.3 INJECTION, SOLUTION, CONCENTRATE INTRAVENOUS AS NEEDED
Status: CANCELLED | OUTPATIENT
Start: 2022-10-30

## 2022-10-28 RX ORDER — ALBUTEROL SULFATE 0.83 MG/ML
2.5 SOLUTION RESPIRATORY (INHALATION) AS NEEDED
Status: CANCELLED
Start: 2022-10-30

## 2022-10-28 RX ORDER — ACETAMINOPHEN 325 MG/1
650 TABLET ORAL AS NEEDED
Status: CANCELLED
Start: 2022-10-30

## 2022-10-28 RX ORDER — HYDROCORTISONE SODIUM SUCCINATE 100 MG/2ML
100 INJECTION, POWDER, FOR SOLUTION INTRAMUSCULAR; INTRAVENOUS AS NEEDED
Status: CANCELLED | OUTPATIENT
Start: 2022-10-30

## 2022-10-28 RX ORDER — DIPHENHYDRAMINE HYDROCHLORIDE 50 MG/ML
25 INJECTION, SOLUTION INTRAMUSCULAR; INTRAVENOUS AS NEEDED
Status: CANCELLED
Start: 2022-10-30

## 2022-10-28 RX ORDER — ONDANSETRON 2 MG/ML
8 INJECTION INTRAMUSCULAR; INTRAVENOUS AS NEEDED
Status: CANCELLED | OUTPATIENT
Start: 2022-10-30

## 2022-10-28 RX ORDER — DIPHENHYDRAMINE HYDROCHLORIDE 50 MG/ML
50 INJECTION, SOLUTION INTRAMUSCULAR; INTRAVENOUS AS NEEDED
Status: CANCELLED
Start: 2022-10-30

## 2022-10-28 RX ADMIN — MEPOLIZUMAB 100 MG: 100 INJECTION, POWDER, FOR SOLUTION SUBCUTANEOUS at 14:32

## 2022-11-28 ENCOUNTER — HOSPITAL ENCOUNTER (OUTPATIENT)
Dept: INFUSION THERAPY | Age: 77
Discharge: HOME | End: 2022-11-28
Attending: INTERNAL MEDICINE
Payer: MEDICARE

## 2022-11-28 VITALS
OXYGEN SATURATION: 98 % | HEART RATE: 78 BPM | RESPIRATION RATE: 16 BRPM | SYSTOLIC BLOOD PRESSURE: 128 MMHG | TEMPERATURE: 98 F | DIASTOLIC BLOOD PRESSURE: 72 MMHG

## 2022-11-28 DIAGNOSIS — J45.50 SEVERE PERSISTENT ASTHMA, UNSPECIFIED WHETHER COMPLICATED: Primary | ICD-10-CM

## 2022-11-28 PROCEDURE — 96372 THER/PROPH/DIAG INJ SC/IM: CPT

## 2022-11-28 PROCEDURE — 74011250636 HC RX REV CODE- 250/636: Performed by: INTERNAL MEDICINE

## 2022-11-28 RX ORDER — ONDANSETRON 2 MG/ML
8 INJECTION INTRAMUSCULAR; INTRAVENOUS AS NEEDED
OUTPATIENT
Start: 2022-12-19

## 2022-11-28 RX ORDER — ACETAMINOPHEN 325 MG/1
650 TABLET ORAL AS NEEDED
Start: 2022-12-19

## 2022-11-28 RX ORDER — ALBUTEROL SULFATE 0.83 MG/ML
2.5 SOLUTION RESPIRATORY (INHALATION) AS NEEDED
Start: 2022-12-19

## 2022-11-28 RX ORDER — DIPHENHYDRAMINE HYDROCHLORIDE 50 MG/ML
50 INJECTION, SOLUTION INTRAMUSCULAR; INTRAVENOUS AS NEEDED
Start: 2022-12-19

## 2022-11-28 RX ORDER — HYDROCORTISONE SODIUM SUCCINATE 100 MG/2ML
100 INJECTION, POWDER, FOR SOLUTION INTRAMUSCULAR; INTRAVENOUS AS NEEDED
OUTPATIENT
Start: 2022-12-19

## 2022-11-28 RX ORDER — DIPHENHYDRAMINE HYDROCHLORIDE 50 MG/ML
25 INJECTION, SOLUTION INTRAMUSCULAR; INTRAVENOUS AS NEEDED
Start: 2022-12-19

## 2022-11-28 RX ORDER — EPINEPHRINE 1 MG/ML
0.3 INJECTION, SOLUTION, CONCENTRATE INTRAVENOUS AS NEEDED
OUTPATIENT
Start: 2022-12-19

## 2022-11-28 RX ADMIN — MEPOLIZUMAB 100 MG: 100 INJECTION, POWDER, FOR SOLUTION SUBCUTANEOUS at 14:35

## 2022-11-28 NOTE — PROGRESS NOTES
[facility] Progress Note    Date: 2022    Name: Sukhi Allen    MRN: [id number]         : 1945      Nucala 100 mg injection every 28 days      Ms. Ritter was assessed and education was provided. Denies any problems with previous injection. Ms. Ritter's vitals were reviewed and patient was observed for 5 minutes prior to treatment. Visit Vitals  /72 (BP 1 Location: Right arm)   Pulse 78   Temp 98 °F (36.7 °C)   Resp 16   SpO2 98%     Took patients blood pressure manually, because she was concern about the elevation in the automatic. Used upper right arm. Nucala 100 mg/1 ml was administered subcutaneously in back of upper left arm. No irritation or drainage noted from site, band aid applied. Observed for 15 minutes, no s/s reaction noted. Ms. Elsa Llanes tolerated well, and had no complaints. Patient armband removed and shredded. Tolerated without any complications or reactions. Ms. Elsa Llanes was discharged from [place] [address] in stable condition at 1455. She is to return on 23 at 1400 for her next appointment for Nucala injeciton.     Daniela Clark RN  2022

## 2023-01-04 ENCOUNTER — HOSPITAL ENCOUNTER (OUTPATIENT)
Dept: INFUSION THERAPY | Age: 78
Discharge: HOME | End: 2023-01-04
Attending: INTERNAL MEDICINE
Payer: MEDICARE

## 2023-01-04 VITALS
TEMPERATURE: 98.1 F | DIASTOLIC BLOOD PRESSURE: 70 MMHG | SYSTOLIC BLOOD PRESSURE: 128 MMHG | HEART RATE: 64 BPM | OXYGEN SATURATION: 97 % | RESPIRATION RATE: 16 BRPM

## 2023-01-04 DIAGNOSIS — J45.50 SEVERE PERSISTENT ASTHMA, UNSPECIFIED WHETHER COMPLICATED: Primary | ICD-10-CM

## 2023-01-04 PROCEDURE — 96372 THER/PROPH/DIAG INJ SC/IM: CPT

## 2023-01-04 PROCEDURE — 74011250636 HC RX REV CODE- 250/636: Performed by: INTERNAL MEDICINE

## 2023-01-04 RX ORDER — ALBUTEROL SULFATE 0.83 MG/ML
2.5 SOLUTION RESPIRATORY (INHALATION) AS NEEDED
Start: 2023-01-04

## 2023-01-04 RX ORDER — HYDROCORTISONE SODIUM SUCCINATE 100 MG/2ML
100 INJECTION, POWDER, FOR SOLUTION INTRAMUSCULAR; INTRAVENOUS AS NEEDED
OUTPATIENT
Start: 2023-01-04

## 2023-01-04 RX ORDER — EPINEPHRINE 1 MG/ML
0.3 INJECTION, SOLUTION, CONCENTRATE INTRAVENOUS AS NEEDED
OUTPATIENT
Start: 2023-01-04

## 2023-01-04 RX ORDER — DIPHENHYDRAMINE HYDROCHLORIDE 50 MG/ML
25 INJECTION, SOLUTION INTRAMUSCULAR; INTRAVENOUS AS NEEDED
Start: 2023-01-04

## 2023-01-04 RX ORDER — DIPHENHYDRAMINE HYDROCHLORIDE 50 MG/ML
50 INJECTION, SOLUTION INTRAMUSCULAR; INTRAVENOUS AS NEEDED
Start: 2023-01-04

## 2023-01-04 RX ORDER — ONDANSETRON 2 MG/ML
8 INJECTION INTRAMUSCULAR; INTRAVENOUS AS NEEDED
OUTPATIENT
Start: 2023-01-04

## 2023-01-04 RX ORDER — ACETAMINOPHEN 325 MG/1
650 TABLET ORAL AS NEEDED
Start: 2023-01-04

## 2023-01-04 RX ADMIN — MEPOLIZUMAB 100 MG: 100 INJECTION, POWDER, FOR SOLUTION SUBCUTANEOUS at 14:25

## 2023-01-04 NOTE — PROGRESS NOTES
[facility] Progress Note    Date: 2023    Name: Francesca Degree    MRN: [id number]         : 1945      Nucala 100 mg injection every 28 days      Ms. Ritter was assessed and education was provided. Denies any problems with previous injection. Ms. Ritter's vitals were reviewed and patient was observed for 5 minutes prior to treatment. Visit Vitals  /70 (BP 1 Location: Right arm)   Pulse 64   Temp 98.1 °F (36.7 °C)   Resp 16   SpO2 97%     Took patients blood pressure manually, because she was concern about the elevation in the automatic. Used lower right arm. Nucala 100 mg/1 ml was administered subcutaneously in back of upper right arm. No irritation or drainage noted from site, band aid applied. Observed for 30 minutes, no s/s reaction noted. .       Ms. Ritter tolerated well, and had no complaints. Patient armband removed and shredded. request to be observed for 30 minutes post injection. Tolerated without any complications or reactions. Ms. Susana Rhodes was discharged from [place] [address] in stable condition at 1500. She is to return on 2023 @ 1400 for her next appointment for Nucala injeciton.     Cora Issa RN  2023

## 2023-01-26 RX ORDER — ONDANSETRON 2 MG/ML
8 INJECTION INTRAMUSCULAR; INTRAVENOUS AS NEEDED
Status: CANCELLED | OUTPATIENT
Start: 2023-02-02

## 2023-01-26 RX ORDER — DIPHENHYDRAMINE HYDROCHLORIDE 50 MG/ML
25 INJECTION, SOLUTION INTRAMUSCULAR; INTRAVENOUS AS NEEDED
Status: CANCELLED
Start: 2023-02-02

## 2023-01-26 RX ORDER — DIPHENHYDRAMINE HYDROCHLORIDE 50 MG/ML
50 INJECTION, SOLUTION INTRAMUSCULAR; INTRAVENOUS AS NEEDED
Status: CANCELLED
Start: 2023-02-02

## 2023-01-26 RX ORDER — ALBUTEROL SULFATE 0.83 MG/ML
2.5 SOLUTION RESPIRATORY (INHALATION) AS NEEDED
Status: CANCELLED
Start: 2023-02-02

## 2023-01-26 RX ORDER — ACETAMINOPHEN 325 MG/1
650 TABLET ORAL AS NEEDED
Status: CANCELLED
Start: 2023-02-02

## 2023-01-26 RX ORDER — EPINEPHRINE 1 MG/ML
0.3 INJECTION, SOLUTION, CONCENTRATE INTRAVENOUS AS NEEDED
Status: CANCELLED | OUTPATIENT
Start: 2023-02-02

## 2023-01-26 RX ORDER — HYDROCORTISONE SODIUM SUCCINATE 100 MG/2ML
100 INJECTION, POWDER, FOR SOLUTION INTRAMUSCULAR; INTRAVENOUS AS NEEDED
Status: CANCELLED | OUTPATIENT
Start: 2023-02-02

## 2023-02-02 ENCOUNTER — HOSPITAL ENCOUNTER (OUTPATIENT)
Dept: INFUSION THERAPY | Age: 78
Discharge: HOME | End: 2023-02-02
Attending: INTERNAL MEDICINE
Payer: MEDICARE

## 2023-02-02 VITALS
DIASTOLIC BLOOD PRESSURE: 78 MMHG | RESPIRATION RATE: 16 BRPM | HEART RATE: 87 BPM | OXYGEN SATURATION: 99 % | TEMPERATURE: 98 F | SYSTOLIC BLOOD PRESSURE: 142 MMHG

## 2023-02-02 DIAGNOSIS — J45.50 SEVERE PERSISTENT ASTHMA, UNSPECIFIED WHETHER COMPLICATED: Primary | ICD-10-CM

## 2023-02-02 PROCEDURE — 74011250636 HC RX REV CODE- 250/636: Performed by: INTERNAL MEDICINE

## 2023-02-02 PROCEDURE — 96372 THER/PROPH/DIAG INJ SC/IM: CPT

## 2023-02-02 RX ORDER — HYDROCORTISONE SODIUM SUCCINATE 100 MG/2ML
100 INJECTION, POWDER, FOR SOLUTION INTRAMUSCULAR; INTRAVENOUS AS NEEDED
OUTPATIENT
Start: 2023-02-26

## 2023-02-02 RX ORDER — ONDANSETRON 2 MG/ML
8 INJECTION INTRAMUSCULAR; INTRAVENOUS AS NEEDED
OUTPATIENT
Start: 2023-02-26

## 2023-02-02 RX ORDER — DIPHENHYDRAMINE HYDROCHLORIDE 50 MG/ML
25 INJECTION, SOLUTION INTRAMUSCULAR; INTRAVENOUS AS NEEDED
Start: 2023-02-26

## 2023-02-02 RX ORDER — ALBUTEROL SULFATE 90 UG/1
4 AEROSOL, METERED RESPIRATORY (INHALATION) PRN
Status: CANCELLED | OUTPATIENT
Start: 2023-03-02

## 2023-02-02 RX ORDER — EPINEPHRINE 1 MG/ML
0.3 INJECTION, SOLUTION, CONCENTRATE INTRAVENOUS PRN
Status: CANCELLED | OUTPATIENT
Start: 2023-03-02

## 2023-02-02 RX ORDER — SODIUM CHLORIDE 9 MG/ML
INJECTION, SOLUTION INTRAVENOUS CONTINUOUS
Status: CANCELLED | OUTPATIENT
Start: 2023-03-02

## 2023-02-02 RX ORDER — EPINEPHRINE 1 MG/ML
0.3 INJECTION, SOLUTION, CONCENTRATE INTRAVENOUS AS NEEDED
OUTPATIENT
Start: 2023-02-26

## 2023-02-02 RX ORDER — ACETAMINOPHEN 325 MG/1
650 TABLET ORAL
Status: CANCELLED | OUTPATIENT
Start: 2023-03-02

## 2023-02-02 RX ORDER — DIPHENHYDRAMINE HYDROCHLORIDE 50 MG/ML
50 INJECTION, SOLUTION INTRAMUSCULAR; INTRAVENOUS AS NEEDED
Start: 2023-02-26

## 2023-02-02 RX ORDER — DIPHENHYDRAMINE HYDROCHLORIDE 50 MG/ML
50 INJECTION INTRAMUSCULAR; INTRAVENOUS
Status: CANCELLED | OUTPATIENT
Start: 2023-03-02

## 2023-02-02 RX ORDER — ALBUTEROL SULFATE 0.83 MG/ML
2.5 SOLUTION RESPIRATORY (INHALATION) AS NEEDED
Start: 2023-02-26

## 2023-02-02 RX ORDER — ACETAMINOPHEN 325 MG/1
650 TABLET ORAL AS NEEDED
Start: 2023-02-26

## 2023-02-02 RX ORDER — ONDANSETRON 2 MG/ML
8 INJECTION INTRAMUSCULAR; INTRAVENOUS
Status: CANCELLED | OUTPATIENT
Start: 2023-03-02

## 2023-02-02 RX ADMIN — MEPOLIZUMAB 100 MG: 100 INJECTION, POWDER, FOR SOLUTION SUBCUTANEOUS at 14:53

## 2023-02-02 NOTE — PROGRESS NOTES
[facility] Progress Note    Date: 2023    Name: Becky Valdes    MRN: [id number]         : 1945      Nucala 100 mg injection every 28 days      Ms. Ritter was assessed and education was provided. Denies any problems with previous injection. Ms. Ritter's vitals were reviewed and patient was observed for 5 minutes prior to treatment. Visit Vitals  BP (!) 142/78 (BP 1 Location: Right arm)   Pulse 87   Temp 98 °F (36.7 °C)   Resp 16   SpO2 99%     Took patients blood pressure manually, because she was concern about the elevation in the automatic. Used upper right arm. Nucala 100 mg/1 ml was administered subcutaneously in back of upper right arm. No irritation or drainage noted from site, band aid applied. Observed for 30 minutes, no s/s reaction noted. .       Ms. Ritter tolerated well, and had no complaints. Patient armband removed and shredded. request to be observed for 30 minutes post injection. Tolerated without any complications or reactions. Ms. Jessica Bunch was discharged from [place] [address] in stable condition at 1525.      El Mei RN  2023

## 2023-03-02 ENCOUNTER — HOSPITAL ENCOUNTER (OUTPATIENT)
Facility: HOSPITAL | Age: 78
Setting detail: INFUSION SERIES
End: 2023-03-02
Payer: MEDICARE

## 2023-03-02 VITALS
TEMPERATURE: 98.2 F | DIASTOLIC BLOOD PRESSURE: 74 MMHG | RESPIRATION RATE: 16 BRPM | SYSTOLIC BLOOD PRESSURE: 128 MMHG | OXYGEN SATURATION: 98 % | HEART RATE: 81 BPM

## 2023-03-02 DIAGNOSIS — J45.50 SEVERE PERSISTENT ASTHMA, UNSPECIFIED WHETHER COMPLICATED: Primary | ICD-10-CM

## 2023-03-02 PROCEDURE — 6360000002 HC RX W HCPCS: Performed by: INTERNAL MEDICINE

## 2023-03-02 PROCEDURE — 96372 THER/PROPH/DIAG INJ SC/IM: CPT

## 2023-03-02 PROCEDURE — 2580000003 HC RX 258

## 2023-03-02 RX ORDER — ACETAMINOPHEN 325 MG/1
650 TABLET ORAL
Status: CANCELLED | OUTPATIENT
Start: 2023-03-30

## 2023-03-02 RX ORDER — DIPHENHYDRAMINE HYDROCHLORIDE 50 MG/ML
50 INJECTION INTRAMUSCULAR; INTRAVENOUS
Status: CANCELLED | OUTPATIENT
Start: 2023-03-30

## 2023-03-02 RX ORDER — ALBUTEROL SULFATE 90 UG/1
4 AEROSOL, METERED RESPIRATORY (INHALATION) PRN
Status: CANCELLED | OUTPATIENT
Start: 2023-03-30

## 2023-03-02 RX ORDER — ONDANSETRON 2 MG/ML
8 INJECTION INTRAMUSCULAR; INTRAVENOUS
Status: CANCELLED | OUTPATIENT
Start: 2023-03-30

## 2023-03-02 RX ORDER — EPINEPHRINE 1 MG/ML
0.3 INJECTION, SOLUTION, CONCENTRATE INTRAVENOUS PRN
Status: CANCELLED | OUTPATIENT
Start: 2023-03-30

## 2023-03-02 RX ORDER — WATER 1000 ML/1000ML
INJECTION, SOLUTION INTRAVENOUS
Status: COMPLETED
Start: 2023-03-02 | End: 2023-03-02

## 2023-03-02 RX ORDER — SODIUM CHLORIDE 9 MG/ML
INJECTION, SOLUTION INTRAVENOUS CONTINUOUS
Status: CANCELLED | OUTPATIENT
Start: 2023-03-30

## 2023-03-02 RX ADMIN — MEPOLIZUMAB 100 MG: 100 INJECTION, POWDER, FOR SOLUTION SUBCUTANEOUS at 14:21

## 2023-03-02 RX ADMIN — WATER 10 ML: 1 INJECTION INTRAMUSCULAR; INTRAVENOUS; SUBCUTANEOUS at 14:21

## 2023-03-02 ASSESSMENT — PAIN DESCRIPTION - LOCATION: LOCATION: GENERALIZED

## 2023-03-02 ASSESSMENT — PAIN SCALES - GENERAL: PAINLEVEL_OUTOF10: 5

## 2023-03-02 NOTE — PROGRESS NOTES
[facility] Progress Note    Date: 2023    Name: Venkata Sauceda    MRN: [id number]         : 1945      Nucala 100 mg injection every 28 days      Ms. Jordan was assessed and education was provided. Denies any problems with previous injection. Ms. Jordan's vitals were reviewed and patient was observed for 5 minutes prior to treatment. Visit Vitals  /74    Pulse 81   Temp 98.2°F (36.8°C)   Resp 16   SpO2 98%     Took patients blood pressure manually, because she was concern about the elevation in the automatic. Used upper right arm. Nucala 100 mg/1 ml was administered subcutaneously in back of upper right arm. No irritation or drainage noted from site, band aid applied. Ms. Josy Quarles tolerated well, and had no complaints. Patient armband removed and shredded. request to be observed for 30 minutes post injection. Tolerated without any complications or reactions. Ms. Josy Quarles was discharged from [place] [address] in stable condition at 1500.      Tiffanie Jiménez RN  2023

## 2023-03-30 ENCOUNTER — HOSPITAL ENCOUNTER (OUTPATIENT)
Facility: HOSPITAL | Age: 78
Setting detail: INFUSION SERIES
End: 2023-03-30
Payer: MEDICARE

## 2023-03-30 VITALS — TEMPERATURE: 98.1 F | DIASTOLIC BLOOD PRESSURE: 72 MMHG | HEART RATE: 74 BPM | SYSTOLIC BLOOD PRESSURE: 128 MMHG

## 2023-03-30 DIAGNOSIS — J45.50 SEVERE PERSISTENT ASTHMA, UNSPECIFIED WHETHER COMPLICATED: Primary | ICD-10-CM

## 2023-03-30 PROCEDURE — 2580000003 HC RX 258: Performed by: INTERNAL MEDICINE

## 2023-03-30 PROCEDURE — 6360000002 HC RX W HCPCS: Performed by: INTERNAL MEDICINE

## 2023-03-30 PROCEDURE — 96372 THER/PROPH/DIAG INJ SC/IM: CPT

## 2023-03-30 RX ORDER — SODIUM CHLORIDE 9 MG/ML
INJECTION, SOLUTION INTRAVENOUS CONTINUOUS
Status: CANCELLED | OUTPATIENT
Start: 2023-04-20

## 2023-03-30 RX ORDER — ALBUTEROL SULFATE 90 UG/1
4 AEROSOL, METERED RESPIRATORY (INHALATION) PRN
Status: CANCELLED | OUTPATIENT
Start: 2023-04-20

## 2023-03-30 RX ORDER — DIPHENHYDRAMINE HYDROCHLORIDE 50 MG/ML
50 INJECTION INTRAMUSCULAR; INTRAVENOUS
Status: CANCELLED | OUTPATIENT
Start: 2023-04-20

## 2023-03-30 RX ORDER — EPINEPHRINE 1 MG/ML
0.3 INJECTION, SOLUTION, CONCENTRATE INTRAVENOUS PRN
Status: CANCELLED | OUTPATIENT
Start: 2023-04-20

## 2023-03-30 RX ORDER — ONDANSETRON 2 MG/ML
8 INJECTION INTRAMUSCULAR; INTRAVENOUS
Status: CANCELLED | OUTPATIENT
Start: 2023-04-20

## 2023-03-30 RX ORDER — ACETAMINOPHEN 325 MG/1
650 TABLET ORAL
Status: CANCELLED | OUTPATIENT
Start: 2023-04-20

## 2023-03-30 RX ADMIN — WATER 100 MG: 1 INJECTION INTRAMUSCULAR; INTRAVENOUS; SUBCUTANEOUS at 15:22

## 2023-03-30 ASSESSMENT — PAIN DESCRIPTION - LOCATION: LOCATION: GENERALIZED

## 2023-03-30 ASSESSMENT — PAIN SCALES - GENERAL: PAINLEVEL_OUTOF10: 6

## 2023-03-30 NOTE — PROGRESS NOTES
[facility] Progress Note    Date: 2023    Name: Owen Trujillo    MRN: [id number]         : 1945      Nucala 100 mg injection every 28 days      Ms. Jordan was assessed and education was provided. Denies any problems with previous injection. Ms. Jordan's vitals were reviewed and patient was observed for 5 minutes prior to treatment. Visit Vitals  /74    Pulse 81   Temp 98.2°F (36.8°C)   Resp 16   SpO2 98%     Took patients blood pressure manually, because she was concern about the elevation in the automatic. Used upper right arm. Nucala 100 mg/1 ml was administered subcutaneously in back of upper right arm. No irritation or drainage noted from site, band aid applied. Ms. Eduardo Peoples tolerated well, and had no complaints. Patient armband removed and shredded. request to be observed for 30 minutes post injection. Tolerated without any complications or reactions. Ms. Eduardo Peoples was discharged from [place] [address] in stable condition at 1550.   She is scheduled to return 2023 @ 200 pm   Kaiden Orta RN, BSN

## 2023-04-27 ENCOUNTER — HOSPITAL ENCOUNTER (OUTPATIENT)
Facility: HOSPITAL | Age: 78
Setting detail: INFUSION SERIES
End: 2023-04-27
Payer: MEDICARE

## 2023-04-27 VITALS
SYSTOLIC BLOOD PRESSURE: 140 MMHG | DIASTOLIC BLOOD PRESSURE: 87 MMHG | OXYGEN SATURATION: 98 % | HEART RATE: 81 BPM | TEMPERATURE: 98.1 F

## 2023-04-27 DIAGNOSIS — J45.50 SEVERE PERSISTENT ASTHMA, UNSPECIFIED WHETHER COMPLICATED: Primary | ICD-10-CM

## 2023-04-27 PROCEDURE — 6360000002 HC RX W HCPCS: Performed by: INTERNAL MEDICINE

## 2023-04-27 PROCEDURE — 96372 THER/PROPH/DIAG INJ SC/IM: CPT

## 2023-04-27 PROCEDURE — 2580000003 HC RX 258: Performed by: INTERNAL MEDICINE

## 2023-04-27 RX ORDER — ONDANSETRON 2 MG/ML
8 INJECTION INTRAMUSCULAR; INTRAVENOUS
Status: CANCELLED | OUTPATIENT
Start: 2023-05-25

## 2023-04-27 RX ORDER — ALBUTEROL SULFATE 90 UG/1
4 AEROSOL, METERED RESPIRATORY (INHALATION) PRN
Status: CANCELLED | OUTPATIENT
Start: 2023-05-25

## 2023-04-27 RX ORDER — ACETAMINOPHEN 325 MG/1
650 TABLET ORAL
Status: CANCELLED | OUTPATIENT
Start: 2023-05-25

## 2023-04-27 RX ORDER — EPINEPHRINE 1 MG/ML
0.3 INJECTION, SOLUTION, CONCENTRATE INTRAVENOUS PRN
Status: CANCELLED | OUTPATIENT
Start: 2023-05-25

## 2023-04-27 RX ORDER — SODIUM CHLORIDE 9 MG/ML
INJECTION, SOLUTION INTRAVENOUS CONTINUOUS
Status: CANCELLED | OUTPATIENT
Start: 2023-05-25

## 2023-04-27 RX ORDER — DIPHENHYDRAMINE HYDROCHLORIDE 50 MG/ML
50 INJECTION INTRAMUSCULAR; INTRAVENOUS
Status: CANCELLED | OUTPATIENT
Start: 2023-05-25

## 2023-04-27 RX ADMIN — WATER 100 MG: 1 INJECTION INTRAMUSCULAR; INTRAVENOUS; SUBCUTANEOUS at 14:53

## 2023-04-27 ASSESSMENT — PAIN DESCRIPTION - LOCATION: LOCATION: GENERALIZED

## 2023-04-27 ASSESSMENT — PAIN SCALES - GENERAL: PAINLEVEL_OUTOF10: 4

## 2023-04-27 NOTE — PROGRESS NOTES
[facility] Progress Note    Date: 2023    Name: Lazara Rolle    MRN: [id number]         : 1945      Nucala 100 mg injection every 28 days      Ms. Jordan was assessed and education was provided. Denies any problems with previous injection. Ms. Jordan's vitals were reviewed and patient was observed for 5 minutes prior to treatment. Visit Vitals  Patient Vitals for the past 12 hrs:   Temp Pulse BP SpO2   23 1445 -- -- (!) 140/87 --   23 1415 98.1 °F (36.7 °C) 81 (!) 149/82 98 %       Took patients blood pressure manually, because she was concern about the elevation in the automatic. Used upper right arm. Nucala 100 mg/1 ml was administered subcutaneously in back of upper right arm. No irritation or drainage noted from site, band aid applied. Ms. Kel Mitchell tolerated well, and had no complaints. Patient armband removed and shredded. request to be observed for 30 minutes post injection. Tolerated without any complications or reactions. Ms. Kel Mitchell was discharged from [place] [address] in stable condition at 1515.      Roverto aGona RN  2023

## 2023-05-25 ENCOUNTER — HOSPITAL ENCOUNTER (OUTPATIENT)
Facility: HOSPITAL | Age: 78
Setting detail: INFUSION SERIES
End: 2023-05-25
Payer: MEDICARE

## 2023-05-25 VITALS
HEART RATE: 84 BPM | TEMPERATURE: 97.3 F | RESPIRATION RATE: 16 BRPM | OXYGEN SATURATION: 98 % | SYSTOLIC BLOOD PRESSURE: 122 MMHG | DIASTOLIC BLOOD PRESSURE: 72 MMHG

## 2023-05-25 DIAGNOSIS — J45.50 SEVERE PERSISTENT ASTHMA, UNSPECIFIED WHETHER COMPLICATED: Primary | ICD-10-CM

## 2023-05-25 PROCEDURE — 96372 THER/PROPH/DIAG INJ SC/IM: CPT

## 2023-05-25 PROCEDURE — 2580000003 HC RX 258: Performed by: INTERNAL MEDICINE

## 2023-05-25 PROCEDURE — 6360000002 HC RX W HCPCS: Performed by: INTERNAL MEDICINE

## 2023-05-25 RX ORDER — ALBUTEROL SULFATE 90 UG/1
4 AEROSOL, METERED RESPIRATORY (INHALATION) PRN
Status: CANCELLED | OUTPATIENT
Start: 2023-06-22

## 2023-05-25 RX ORDER — SODIUM CHLORIDE 9 MG/ML
INJECTION, SOLUTION INTRAVENOUS CONTINUOUS
Status: CANCELLED | OUTPATIENT
Start: 2023-06-22

## 2023-05-25 RX ORDER — EPINEPHRINE 1 MG/ML
0.3 INJECTION, SOLUTION, CONCENTRATE INTRAVENOUS PRN
Status: CANCELLED | OUTPATIENT
Start: 2023-06-22

## 2023-05-25 RX ORDER — DIPHENHYDRAMINE HYDROCHLORIDE 50 MG/ML
50 INJECTION INTRAMUSCULAR; INTRAVENOUS
Status: CANCELLED | OUTPATIENT
Start: 2023-06-22

## 2023-05-25 RX ORDER — ONDANSETRON 2 MG/ML
8 INJECTION INTRAMUSCULAR; INTRAVENOUS
Status: CANCELLED | OUTPATIENT
Start: 2023-06-22

## 2023-05-25 RX ORDER — ACETAMINOPHEN 325 MG/1
650 TABLET ORAL
Status: CANCELLED | OUTPATIENT
Start: 2023-06-22

## 2023-05-25 RX ADMIN — WATER 100 MG: 1 INJECTION INTRAMUSCULAR; INTRAVENOUS; SUBCUTANEOUS at 14:55

## 2023-05-25 ASSESSMENT — PAIN DESCRIPTION - LOCATION: LOCATION: GENERALIZED

## 2023-05-25 ASSESSMENT — PAIN DESCRIPTION - DESCRIPTORS: DESCRIPTORS: ACHING

## 2023-05-25 ASSESSMENT — PAIN SCALES - GENERAL: PAINLEVEL_OUTOF10: 5

## 2023-05-25 NOTE — PROGRESS NOTES
[facility] Progress Note    Date: May 25, 2023    Name: Venkata Sauceda    MRN: [id number]         : 1945      Nucala 100 mg injection every 28 days      Ms. Jordan was assessed and education was provided. Denies any problems with previous injection. Ms. Jordan's vitals were reviewed and patient was observed for 5 minutes prior to treatment. Visit Vitals  Patient Vitals for the past 12 hrs:   Temp Pulse Resp BP SpO2   23 1430 97.3 °F (36.3 °C) 84 16 122/72 98 %         Took patients blood pressure manually, because she was concern about the elevation in the automatic. Used upper right arm. Nucala 100 mg/1 ml was administered subcutaneously in back of upper left arm. No irritation or drainage noted from site, band aid applied. Ms. Josy Quarles tolerated well, and had no complaints. Patient armband removed and shredded. request to be observed for 25 minutes post injection. Tolerated without any complications or reactions. Ms. Josy Qualres was discharged from [place] [address] in stable condition at 1520.      Tiffanie Jiménez RN  May 25 , 2023

## 2023-06-22 ENCOUNTER — HOSPITAL ENCOUNTER (OUTPATIENT)
Facility: HOSPITAL | Age: 78
Setting detail: INFUSION SERIES
End: 2023-06-22
Payer: MEDICARE

## 2023-06-22 VITALS
HEART RATE: 77 BPM | DIASTOLIC BLOOD PRESSURE: 78 MMHG | SYSTOLIC BLOOD PRESSURE: 130 MMHG | TEMPERATURE: 98.2 F | OXYGEN SATURATION: 96 % | RESPIRATION RATE: 16 BRPM

## 2023-06-22 DIAGNOSIS — J45.50 SEVERE PERSISTENT ASTHMA, UNSPECIFIED WHETHER COMPLICATED: Primary | ICD-10-CM

## 2023-06-22 PROCEDURE — 96372 THER/PROPH/DIAG INJ SC/IM: CPT

## 2023-06-22 PROCEDURE — 2580000003 HC RX 258: Performed by: INTERNAL MEDICINE

## 2023-06-22 PROCEDURE — 6360000002 HC RX W HCPCS: Performed by: INTERNAL MEDICINE

## 2023-06-22 RX ORDER — DIPHENHYDRAMINE HYDROCHLORIDE 50 MG/ML
50 INJECTION INTRAMUSCULAR; INTRAVENOUS
Status: CANCELLED | OUTPATIENT
Start: 2023-07-20

## 2023-06-22 RX ORDER — ONDANSETRON 2 MG/ML
8 INJECTION INTRAMUSCULAR; INTRAVENOUS
Status: CANCELLED | OUTPATIENT
Start: 2023-07-20

## 2023-06-22 RX ORDER — EPINEPHRINE 1 MG/ML
0.3 INJECTION, SOLUTION, CONCENTRATE INTRAVENOUS PRN
Status: CANCELLED | OUTPATIENT
Start: 2023-07-20

## 2023-06-22 RX ORDER — ACETAMINOPHEN 325 MG/1
650 TABLET ORAL
Status: CANCELLED | OUTPATIENT
Start: 2023-07-20

## 2023-06-22 RX ORDER — ALBUTEROL SULFATE 90 UG/1
4 AEROSOL, METERED RESPIRATORY (INHALATION) PRN
Status: CANCELLED | OUTPATIENT
Start: 2023-07-20

## 2023-06-22 RX ORDER — SODIUM CHLORIDE 9 MG/ML
INJECTION, SOLUTION INTRAVENOUS CONTINUOUS
Status: CANCELLED | OUTPATIENT
Start: 2023-07-20

## 2023-06-22 RX ADMIN — WATER 100 MG: 1 INJECTION INTRAMUSCULAR; INTRAVENOUS; SUBCUTANEOUS at 15:03

## 2023-06-22 ASSESSMENT — PAIN DESCRIPTION - LOCATION: LOCATION: GENERALIZED

## 2023-06-22 ASSESSMENT — PAIN SCALES - GENERAL: PAINLEVEL_OUTOF10: 5

## 2023-06-22 ASSESSMENT — PAIN DESCRIPTION - DESCRIPTORS: DESCRIPTORS: ACHING

## 2023-06-22 NOTE — PROGRESS NOTES
[facility] Progress Note    Date: 2023    Name: Nany Sorenson    MRN: [id number]         : 1945      Nucala 100 mg injection every 28 days      Ms. Jordan was assessed and education was provided. Denies any problems with previous injection. Ms. Jordan's vitals were reviewed and patient was observed for 5 minutes prior to treatment. Visit Vitals  Patient Vitals for the past 12 hrs:   Temp Pulse Resp BP SpO2   23 1415 98.2 °F (36.8 °C) 77 16 130/78 96 %         Took patients blood pressure manually, because she was concern about the elevation in the automatic. Used upper right arm. Nucala 100 mg/1 ml was administered subcutaneously in back of upper right arm. No irritation or drainage noted from site, band aid applied. Ms. Rebecca Isidro tolerated well, and had no complaints. Patient armband removed and shredded. request to be observed for 30 minutes post injection. Tolerated without any complications or reactions. Ms. Rebecca Isidro was discharged from [place] [address] in stable condition at 1530.      Ck Lance RN  2023

## 2023-07-20 ENCOUNTER — HOSPITAL ENCOUNTER (OUTPATIENT)
Facility: HOSPITAL | Age: 78
Setting detail: INFUSION SERIES
End: 2023-07-20
Payer: MEDICARE

## 2023-07-25 ENCOUNTER — HOSPITAL ENCOUNTER (OUTPATIENT)
Facility: HOSPITAL | Age: 78
Setting detail: INFUSION SERIES
End: 2023-07-25
Payer: MEDICARE

## 2023-07-25 VITALS
HEART RATE: 86 BPM | OXYGEN SATURATION: 96 % | TEMPERATURE: 98.5 F | SYSTOLIC BLOOD PRESSURE: 118 MMHG | DIASTOLIC BLOOD PRESSURE: 76 MMHG

## 2023-07-25 DIAGNOSIS — J45.50 SEVERE PERSISTENT ASTHMA, UNSPECIFIED WHETHER COMPLICATED: Primary | ICD-10-CM

## 2023-07-25 PROCEDURE — 2580000003 HC RX 258: Performed by: INTERNAL MEDICINE

## 2023-07-25 PROCEDURE — 6360000002 HC RX W HCPCS: Performed by: INTERNAL MEDICINE

## 2023-07-25 PROCEDURE — 6360000002 HC RX W HCPCS

## 2023-07-25 PROCEDURE — 96372 THER/PROPH/DIAG INJ SC/IM: CPT

## 2023-07-25 RX ORDER — ACETAMINOPHEN 325 MG/1
650 TABLET ORAL
Status: CANCELLED | OUTPATIENT
Start: 2023-08-15

## 2023-07-25 RX ORDER — ALBUTEROL SULFATE 90 UG/1
4 AEROSOL, METERED RESPIRATORY (INHALATION) PRN
Status: CANCELLED | OUTPATIENT
Start: 2023-08-15

## 2023-07-25 RX ORDER — SODIUM CHLORIDE 9 MG/ML
INJECTION, SOLUTION INTRAVENOUS CONTINUOUS
Status: CANCELLED | OUTPATIENT
Start: 2023-08-15

## 2023-07-25 RX ORDER — DIPHENHYDRAMINE HYDROCHLORIDE 50 MG/ML
50 INJECTION INTRAMUSCULAR; INTRAVENOUS
Status: CANCELLED | OUTPATIENT
Start: 2023-08-15

## 2023-07-25 RX ORDER — WATER 1000 ML/1000ML
INJECTION, SOLUTION INTRAVENOUS
Status: DISCONTINUED
Start: 2023-07-25 | End: 2023-07-26 | Stop reason: HOSPADM

## 2023-07-25 RX ORDER — ONDANSETRON 2 MG/ML
8 INJECTION INTRAMUSCULAR; INTRAVENOUS
Status: CANCELLED | OUTPATIENT
Start: 2023-08-15

## 2023-07-25 RX ORDER — EPINEPHRINE 1 MG/ML
0.3 INJECTION, SOLUTION, CONCENTRATE INTRAVENOUS PRN
Status: CANCELLED | OUTPATIENT
Start: 2023-08-15

## 2023-07-25 RX ADMIN — WATER 100 MG: 1 INJECTION INTRAMUSCULAR; INTRAVENOUS; SUBCUTANEOUS at 13:38

## 2023-07-25 RX ADMIN — MEPOLIZUMAB 100 MG: 100 INJECTION, POWDER, FOR SOLUTION SUBCUTANEOUS at 13:38

## 2023-07-25 ASSESSMENT — PAIN DESCRIPTION - LOCATION: LOCATION: GENERALIZED

## 2023-07-25 ASSESSMENT — PAIN DESCRIPTION - DESCRIPTORS: DESCRIPTORS: ACHING

## 2023-07-25 ASSESSMENT — PAIN SCALES - GENERAL: PAINLEVEL_OUTOF10: 5

## 2023-08-22 ENCOUNTER — HOSPITAL ENCOUNTER (OUTPATIENT)
Facility: HOSPITAL | Age: 78
Setting detail: INFUSION SERIES
End: 2023-08-22
Payer: MEDICARE

## 2023-08-22 VITALS
HEART RATE: 84 BPM | SYSTOLIC BLOOD PRESSURE: 124 MMHG | OXYGEN SATURATION: 96 % | TEMPERATURE: 98.2 F | DIASTOLIC BLOOD PRESSURE: 84 MMHG

## 2023-08-22 DIAGNOSIS — J45.50 SEVERE PERSISTENT ASTHMA, UNSPECIFIED WHETHER COMPLICATED: Primary | ICD-10-CM

## 2023-08-22 PROCEDURE — 6360000002 HC RX W HCPCS

## 2023-08-22 PROCEDURE — 2580000003 HC RX 258

## 2023-08-22 PROCEDURE — 96372 THER/PROPH/DIAG INJ SC/IM: CPT

## 2023-08-22 RX ORDER — WATER 1000 ML/1000ML
INJECTION, SOLUTION INTRAVENOUS
Status: COMPLETED
Start: 2023-08-22 | End: 2023-08-22

## 2023-08-22 RX ORDER — ONDANSETRON 2 MG/ML
8 INJECTION INTRAMUSCULAR; INTRAVENOUS
Status: CANCELLED | OUTPATIENT
Start: 2023-09-19

## 2023-08-22 RX ORDER — DIPHENHYDRAMINE HYDROCHLORIDE 50 MG/ML
50 INJECTION INTRAMUSCULAR; INTRAVENOUS
Status: CANCELLED | OUTPATIENT
Start: 2023-09-19

## 2023-08-22 RX ORDER — ALBUTEROL SULFATE 90 UG/1
4 AEROSOL, METERED RESPIRATORY (INHALATION) PRN
Status: CANCELLED | OUTPATIENT
Start: 2023-09-19

## 2023-08-22 RX ORDER — ACETAMINOPHEN 325 MG/1
650 TABLET ORAL
Status: CANCELLED | OUTPATIENT
Start: 2023-09-19

## 2023-08-22 RX ORDER — SODIUM CHLORIDE 9 MG/ML
INJECTION, SOLUTION INTRAVENOUS CONTINUOUS
Status: CANCELLED | OUTPATIENT
Start: 2023-09-19

## 2023-08-22 RX ORDER — EPINEPHRINE 1 MG/ML
0.3 INJECTION, SOLUTION, CONCENTRATE INTRAVENOUS PRN
Status: CANCELLED | OUTPATIENT
Start: 2023-09-19

## 2023-08-22 RX ADMIN — MEPOLIZUMAB 100 MG: 100 INJECTION, POWDER, FOR SOLUTION SUBCUTANEOUS at 14:25

## 2023-08-22 RX ADMIN — WATER 10 ML: 1 INJECTION INTRAMUSCULAR; INTRAVENOUS; SUBCUTANEOUS at 14:25

## 2023-08-22 ASSESSMENT — PAIN DESCRIPTION - LOCATION: LOCATION: GENERALIZED

## 2023-08-22 ASSESSMENT — PAIN DESCRIPTION - DESCRIPTORS: DESCRIPTORS: ACHING

## 2023-08-22 ASSESSMENT — PAIN SCALES - GENERAL: PAINLEVEL_OUTOF10: 6

## 2023-09-21 ENCOUNTER — HOSPITAL ENCOUNTER (OUTPATIENT)
Facility: HOSPITAL | Age: 78
Setting detail: INFUSION SERIES
End: 2023-09-21
Payer: MEDICARE

## 2023-09-21 VITALS
DIASTOLIC BLOOD PRESSURE: 70 MMHG | SYSTOLIC BLOOD PRESSURE: 135 MMHG | TEMPERATURE: 97.8 F | OXYGEN SATURATION: 97 % | HEART RATE: 75 BPM

## 2023-09-21 DIAGNOSIS — J45.50 SEVERE PERSISTENT ASTHMA, UNSPECIFIED WHETHER COMPLICATED: Primary | ICD-10-CM

## 2023-09-21 PROCEDURE — 96372 THER/PROPH/DIAG INJ SC/IM: CPT

## 2023-09-21 PROCEDURE — 2580000003 HC RX 258: Performed by: INTERNAL MEDICINE

## 2023-09-21 PROCEDURE — 6360000002 HC RX W HCPCS: Performed by: INTERNAL MEDICINE

## 2023-09-21 RX ORDER — ONDANSETRON 2 MG/ML
8 INJECTION INTRAMUSCULAR; INTRAVENOUS
Status: CANCELLED | OUTPATIENT
Start: 2023-10-19

## 2023-09-21 RX ORDER — ACETAMINOPHEN 325 MG/1
650 TABLET ORAL
Status: CANCELLED | OUTPATIENT
Start: 2023-10-19

## 2023-09-21 RX ORDER — DIPHENHYDRAMINE HYDROCHLORIDE 50 MG/ML
50 INJECTION INTRAMUSCULAR; INTRAVENOUS
Status: CANCELLED | OUTPATIENT
Start: 2023-10-19

## 2023-09-21 RX ORDER — ALBUTEROL SULFATE 90 UG/1
4 AEROSOL, METERED RESPIRATORY (INHALATION) PRN
Status: CANCELLED | OUTPATIENT
Start: 2023-10-19

## 2023-09-21 RX ORDER — SODIUM CHLORIDE 9 MG/ML
INJECTION, SOLUTION INTRAVENOUS CONTINUOUS
Status: CANCELLED | OUTPATIENT
Start: 2023-10-19

## 2023-09-21 RX ORDER — EPINEPHRINE 1 MG/ML
0.3 INJECTION, SOLUTION, CONCENTRATE INTRAVENOUS PRN
Status: CANCELLED | OUTPATIENT
Start: 2023-10-19

## 2023-09-21 RX ADMIN — WATER 100 MG: 1 INJECTION INTRAMUSCULAR; INTRAVENOUS; SUBCUTANEOUS at 14:33

## 2023-09-21 ASSESSMENT — PAIN DESCRIPTION - LOCATION: LOCATION: GENERALIZED

## 2023-09-21 ASSESSMENT — PAIN SCALES - GENERAL: PAINLEVEL_OUTOF10: 7

## 2023-10-19 ENCOUNTER — HOSPITAL ENCOUNTER (OUTPATIENT)
Facility: HOSPITAL | Age: 78
Setting detail: INFUSION SERIES
End: 2023-10-19
Payer: MEDICARE

## 2023-10-19 VITALS
TEMPERATURE: 97 F | HEART RATE: 83 BPM | OXYGEN SATURATION: 97 % | DIASTOLIC BLOOD PRESSURE: 76 MMHG | RESPIRATION RATE: 16 BRPM | SYSTOLIC BLOOD PRESSURE: 138 MMHG

## 2023-10-19 DIAGNOSIS — J45.50 SEVERE PERSISTENT ASTHMA, UNSPECIFIED WHETHER COMPLICATED: Primary | ICD-10-CM

## 2023-10-19 PROCEDURE — 2580000003 HC RX 258: Performed by: INTERNAL MEDICINE

## 2023-10-19 PROCEDURE — 6360000002 HC RX W HCPCS: Performed by: INTERNAL MEDICINE

## 2023-10-19 PROCEDURE — 96372 THER/PROPH/DIAG INJ SC/IM: CPT

## 2023-10-19 RX ORDER — SODIUM CHLORIDE 9 MG/ML
INJECTION, SOLUTION INTRAVENOUS CONTINUOUS
OUTPATIENT
Start: 2023-11-16

## 2023-10-19 RX ORDER — EPINEPHRINE 1 MG/ML
0.3 INJECTION, SOLUTION, CONCENTRATE INTRAVENOUS PRN
OUTPATIENT
Start: 2023-11-16

## 2023-10-19 RX ORDER — ONDANSETRON 2 MG/ML
8 INJECTION INTRAMUSCULAR; INTRAVENOUS
OUTPATIENT
Start: 2023-11-16

## 2023-10-19 RX ORDER — ALBUTEROL SULFATE 90 UG/1
4 AEROSOL, METERED RESPIRATORY (INHALATION) PRN
OUTPATIENT
Start: 2023-11-16

## 2023-10-19 RX ORDER — ACETAMINOPHEN 325 MG/1
650 TABLET ORAL
OUTPATIENT
Start: 2023-11-16

## 2023-10-19 RX ORDER — DIPHENHYDRAMINE HYDROCHLORIDE 50 MG/ML
50 INJECTION INTRAMUSCULAR; INTRAVENOUS
OUTPATIENT
Start: 2023-11-16

## 2023-10-19 RX ADMIN — WATER 100 MG: 1 INJECTION INTRAMUSCULAR; INTRAVENOUS; SUBCUTANEOUS at 14:26

## 2023-10-19 ASSESSMENT — PAIN SCALES - GENERAL: PAINLEVEL_OUTOF10: 6

## 2023-10-19 ASSESSMENT — PAIN DESCRIPTION - LOCATION: LOCATION: GENERALIZED

## 2023-11-17 ENCOUNTER — HOSPITAL ENCOUNTER (OUTPATIENT)
Facility: HOSPITAL | Age: 78
Setting detail: INFUSION SERIES
Discharge: HOME | End: 2023-11-17
Payer: MEDICARE

## 2023-11-17 VITALS
TEMPERATURE: 97.2 F | DIASTOLIC BLOOD PRESSURE: 74 MMHG | HEART RATE: 79 BPM | RESPIRATION RATE: 16 BRPM | SYSTOLIC BLOOD PRESSURE: 140 MMHG | OXYGEN SATURATION: 98 %

## 2023-11-17 DIAGNOSIS — J45.50 SEVERE PERSISTENT ASTHMA, UNSPECIFIED WHETHER COMPLICATED: Primary | ICD-10-CM

## 2023-11-17 PROCEDURE — 6360000002 HC RX W HCPCS: Performed by: INTERNAL MEDICINE

## 2023-11-17 PROCEDURE — 96372 THER/PROPH/DIAG INJ SC/IM: CPT

## 2023-11-17 PROCEDURE — 2580000003 HC RX 258: Performed by: INTERNAL MEDICINE

## 2023-11-17 RX ORDER — ONDANSETRON 2 MG/ML
8 INJECTION INTRAMUSCULAR; INTRAVENOUS
OUTPATIENT
Start: 2023-12-15

## 2023-11-17 RX ORDER — SODIUM CHLORIDE 9 MG/ML
INJECTION, SOLUTION INTRAVENOUS CONTINUOUS
OUTPATIENT
Start: 2023-12-15

## 2023-11-17 RX ORDER — ALBUTEROL SULFATE 90 UG/1
4 AEROSOL, METERED RESPIRATORY (INHALATION) PRN
OUTPATIENT
Start: 2023-12-15

## 2023-11-17 RX ORDER — DIPHENHYDRAMINE HYDROCHLORIDE 50 MG/ML
50 INJECTION INTRAMUSCULAR; INTRAVENOUS
OUTPATIENT
Start: 2023-12-15

## 2023-11-17 RX ORDER — ACETAMINOPHEN 325 MG/1
650 TABLET ORAL
OUTPATIENT
Start: 2023-12-15

## 2023-11-17 RX ORDER — EPINEPHRINE 1 MG/ML
0.3 INJECTION, SOLUTION, CONCENTRATE INTRAVENOUS PRN
OUTPATIENT
Start: 2023-12-15

## 2023-11-17 RX ADMIN — WATER 100 MG: 1 INJECTION INTRAMUSCULAR; INTRAVENOUS; SUBCUTANEOUS at 14:20

## 2023-11-17 ASSESSMENT — PAIN SCALES - GENERAL: PAINLEVEL_OUTOF10: 5

## 2023-11-17 ASSESSMENT — PAIN DESCRIPTION - LOCATION: LOCATION: GENERALIZED

## 2023-12-21 ENCOUNTER — HOSPITAL ENCOUNTER (OUTPATIENT)
Facility: HOSPITAL | Age: 78
Setting detail: INFUSION SERIES
Discharge: HOME | End: 2023-12-21
Payer: MEDICARE

## 2023-12-21 VITALS
RESPIRATION RATE: 16 BRPM | TEMPERATURE: 97 F | OXYGEN SATURATION: 95 % | SYSTOLIC BLOOD PRESSURE: 126 MMHG | DIASTOLIC BLOOD PRESSURE: 68 MMHG | HEART RATE: 80 BPM

## 2023-12-21 DIAGNOSIS — J45.50 SEVERE PERSISTENT ASTHMA, UNSPECIFIED WHETHER COMPLICATED: Primary | ICD-10-CM

## 2023-12-21 PROCEDURE — 96372 THER/PROPH/DIAG INJ SC/IM: CPT

## 2023-12-21 PROCEDURE — 2580000003 HC RX 258: Performed by: INTERNAL MEDICINE

## 2023-12-21 PROCEDURE — 6360000002 HC RX W HCPCS: Performed by: INTERNAL MEDICINE

## 2023-12-21 RX ORDER — DOXYCYCLINE HYCLATE 100 MG/1
100 CAPSULE ORAL 2 TIMES DAILY
COMMUNITY

## 2023-12-21 RX ORDER — EPINEPHRINE 1 MG/ML
0.3 INJECTION, SOLUTION, CONCENTRATE INTRAVENOUS PRN
OUTPATIENT
Start: 2024-01-11

## 2023-12-21 RX ORDER — DIPHENHYDRAMINE HYDROCHLORIDE 50 MG/ML
50 INJECTION INTRAMUSCULAR; INTRAVENOUS
OUTPATIENT
Start: 2024-01-11

## 2023-12-21 RX ORDER — ACETAMINOPHEN 325 MG/1
650 TABLET ORAL
OUTPATIENT
Start: 2024-01-11

## 2023-12-21 RX ORDER — ALBUTEROL SULFATE 90 UG/1
4 AEROSOL, METERED RESPIRATORY (INHALATION) PRN
OUTPATIENT
Start: 2024-01-11

## 2023-12-21 RX ORDER — ONDANSETRON 2 MG/ML
8 INJECTION INTRAMUSCULAR; INTRAVENOUS
OUTPATIENT
Start: 2024-01-11

## 2023-12-21 RX ORDER — SODIUM CHLORIDE 9 MG/ML
INJECTION, SOLUTION INTRAVENOUS CONTINUOUS
OUTPATIENT
Start: 2024-01-11

## 2023-12-21 RX ADMIN — WATER 100 MG: 1 INJECTION INTRAMUSCULAR; INTRAVENOUS; SUBCUTANEOUS at 14:48

## 2024-01-19 ENCOUNTER — HOSPITAL ENCOUNTER (OUTPATIENT)
Facility: HOSPITAL | Age: 79
Setting detail: INFUSION SERIES
Discharge: HOME | End: 2024-01-19
Payer: MEDICARE

## 2024-01-19 VITALS
OXYGEN SATURATION: 99 % | SYSTOLIC BLOOD PRESSURE: 132 MMHG | TEMPERATURE: 97.6 F | RESPIRATION RATE: 16 BRPM | DIASTOLIC BLOOD PRESSURE: 72 MMHG | HEART RATE: 87 BPM

## 2024-01-19 DIAGNOSIS — J45.50 SEVERE PERSISTENT ASTHMA, UNSPECIFIED WHETHER COMPLICATED: Primary | ICD-10-CM

## 2024-01-19 PROCEDURE — 6360000002 HC RX W HCPCS

## 2024-01-19 PROCEDURE — 2580000003 HC RX 258

## 2024-01-19 PROCEDURE — 96372 THER/PROPH/DIAG INJ SC/IM: CPT

## 2024-01-19 RX ORDER — WATER 10 ML/10ML
INJECTION INTRAMUSCULAR; INTRAVENOUS; SUBCUTANEOUS
Status: COMPLETED
Start: 2024-01-19 | End: 2024-01-19

## 2024-01-19 RX ORDER — ALBUTEROL SULFATE 90 UG/1
4 AEROSOL, METERED RESPIRATORY (INHALATION) PRN
OUTPATIENT
Start: 2024-02-16

## 2024-01-19 RX ORDER — EPINEPHRINE 1 MG/ML
0.3 INJECTION, SOLUTION, CONCENTRATE INTRAVENOUS PRN
OUTPATIENT
Start: 2024-02-16

## 2024-01-19 RX ORDER — ACETAMINOPHEN 325 MG/1
650 TABLET ORAL
OUTPATIENT
Start: 2024-02-16

## 2024-01-19 RX ORDER — DIPHENHYDRAMINE HYDROCHLORIDE 50 MG/ML
50 INJECTION INTRAMUSCULAR; INTRAVENOUS
OUTPATIENT
Start: 2024-02-16

## 2024-01-19 RX ORDER — SODIUM CHLORIDE 9 MG/ML
INJECTION, SOLUTION INTRAVENOUS CONTINUOUS
OUTPATIENT
Start: 2024-02-16

## 2024-01-19 RX ORDER — ONDANSETRON 2 MG/ML
8 INJECTION INTRAMUSCULAR; INTRAVENOUS
OUTPATIENT
Start: 2024-02-16

## 2024-01-19 RX ADMIN — MEPOLIZUMAB 100 MG: 100 INJECTION, POWDER, FOR SOLUTION SUBCUTANEOUS at 13:24

## 2024-01-19 RX ADMIN — WATER: 1 INJECTION INTRAMUSCULAR; INTRAVENOUS; SUBCUTANEOUS at 13:24

## 2024-01-19 ASSESSMENT — PAIN SCALES - GENERAL: PAINLEVEL_OUTOF10: 5

## 2024-01-19 ASSESSMENT — PAIN DESCRIPTION - LOCATION: LOCATION: GENERALIZED

## 2024-01-19 NOTE — PROGRESS NOTES
[facility] Progress Note    Date: 2023    Name: Katie Jordan    MRN: [id number]         : 1945      Nucala 100 mg injection every 28 days      Ms. Jordan was assessed and education was provided. Denies any problems with previous injection.   Will administer injection in abdomen today per request      Ms. Jordan's vitals were reviewed and patient was observed for 5 minutes prior to treatment.   Visit Vitals  /74    Pulse 81   Temp 98.2°F (36.8°C)   Resp 16   SpO2 98%     Took patients blood pressure manually, because she was concern about the elevation in the automatic and discomfort .   Right  lower wrist used per request     Nucala 100 mg/1 ml was administered subcutaneously in upper left abdominal tissue  . No irritation or drainage noted from site, band aid applied.     Ms. Jordan tolerated well, and had no complaints.  Patient armband removed and shredded.  request to be observed for 30 minutes post injection.  Tolerated without any complications or reactions.     [facility]. [facility] condition at 1330 She is scheduled to return 2024 @ 200 pm     Callie Hawley, RN, BSN

## 2024-02-16 ENCOUNTER — HOSPITAL ENCOUNTER (OUTPATIENT)
Facility: HOSPITAL | Age: 79
Setting detail: INFUSION SERIES
Discharge: HOME | End: 2024-02-16
Payer: MEDICARE

## 2024-02-16 VITALS
SYSTOLIC BLOOD PRESSURE: 138 MMHG | HEART RATE: 76 BPM | OXYGEN SATURATION: 98 % | DIASTOLIC BLOOD PRESSURE: 70 MMHG | TEMPERATURE: 98.2 F | RESPIRATION RATE: 18 BRPM

## 2024-02-16 DIAGNOSIS — J45.50 SEVERE PERSISTENT ASTHMA, UNSPECIFIED WHETHER COMPLICATED: Primary | ICD-10-CM

## 2024-02-16 PROBLEM — J45.909 ASTHMA: Status: ACTIVE | Noted: 2019-10-30

## 2024-02-16 PROCEDURE — 96372 THER/PROPH/DIAG INJ SC/IM: CPT

## 2024-02-16 PROCEDURE — 2580000003 HC RX 258

## 2024-02-16 PROCEDURE — 6360000002 HC RX W HCPCS

## 2024-02-16 RX ORDER — ACETAMINOPHEN 325 MG/1
650 TABLET ORAL
Status: DISCONTINUED | OUTPATIENT
Start: 2024-02-16 | End: 2024-02-17 | Stop reason: HOSPADM

## 2024-02-16 RX ORDER — ONDANSETRON 2 MG/ML
8 INJECTION INTRAMUSCULAR; INTRAVENOUS
Status: DISCONTINUED | OUTPATIENT
Start: 2024-02-16 | End: 2024-02-17 | Stop reason: HOSPADM

## 2024-02-16 RX ORDER — SODIUM CHLORIDE 9 MG/ML
INJECTION, SOLUTION INTRAVENOUS CONTINUOUS
Status: DISCONTINUED | OUTPATIENT
Start: 2024-02-16 | End: 2024-02-17 | Stop reason: HOSPADM

## 2024-02-16 RX ORDER — WATER 10 ML/10ML
INJECTION INTRAMUSCULAR; INTRAVENOUS; SUBCUTANEOUS
Status: COMPLETED
Start: 2024-02-16 | End: 2024-02-16

## 2024-02-16 RX ORDER — DIPHENHYDRAMINE HYDROCHLORIDE 50 MG/ML
50 INJECTION INTRAMUSCULAR; INTRAVENOUS
Status: DISCONTINUED | OUTPATIENT
Start: 2024-02-16 | End: 2024-02-17 | Stop reason: HOSPADM

## 2024-02-16 RX ORDER — ALBUTEROL SULFATE 90 UG/1
4 AEROSOL, METERED RESPIRATORY (INHALATION) PRN
Status: DISCONTINUED | OUTPATIENT
Start: 2024-02-16 | End: 2024-02-17 | Stop reason: HOSPADM

## 2024-02-16 RX ORDER — EPINEPHRINE 1 MG/ML
0.3 INJECTION, SOLUTION, CONCENTRATE INTRAVENOUS PRN
Status: DISCONTINUED | OUTPATIENT
Start: 2024-02-16 | End: 2024-02-17 | Stop reason: HOSPADM

## 2024-02-16 RX ADMIN — WATER 1.2 ML: 1 INJECTION INTRAMUSCULAR; INTRAVENOUS; SUBCUTANEOUS at 14:40

## 2024-02-16 RX ADMIN — MEPOLIZUMAB 100 MG: 100 INJECTION, POWDER, FOR SOLUTION SUBCUTANEOUS at 14:40

## 2024-02-16 ASSESSMENT — PAIN SCALES - GENERAL: PAINLEVEL_OUTOF10: 7

## 2024-02-16 ASSESSMENT — PAIN DESCRIPTION - LOCATION: LOCATION: GENERALIZED

## 2024-02-16 NOTE — PROGRESS NOTES
[facility] Progress Note    Date: 2023    Name: Katie Jordan    MRN: [id number]         : 1945      Nucala 100 mg injection every 28 days      Ms. Jordan was assessed and education was provided. Denies any problems with previous injection.   Will administer injection in abdomen today per request      Ms. Jordan's vitals were reviewed and patient was observed for 5 minutes prior to treatment.   Visit Vitals  Patient Vitals for the past 12 hrs:   Temp Pulse Resp BP SpO2   24 1425 98.2 °F (36.8 °C) 76 18 138/70 98 %     Took patients blood pressure manually, due to pt request . Right  lower FA used.    Nucala 100 mg/1 ml was administered subcutaneously in upper left abdominal tissue  . No irritation or drainage noted from site, band aid applied.     Ms. Jordan tolerated well, and had no complaints.  Patient armband removed and shredded.  request to be observed for 15 minutes post injection.  Tolerated without any complications or reactions.     Ms. Jordan was [facility] condition at 1500. She is scheduled to return 03/15/2024 @ 1400 for next nucala.     Teresa Newby RN, BSN  2024  15:04PM

## 2024-03-15 ENCOUNTER — APPOINTMENT (OUTPATIENT)
Facility: HOSPITAL | Age: 79
End: 2024-03-15
Payer: MEDICARE

## 2024-03-18 ENCOUNTER — HOSPITAL ENCOUNTER (OUTPATIENT)
Facility: HOSPITAL | Age: 79
Setting detail: INFUSION SERIES
Discharge: HOME | End: 2024-03-18
Payer: MEDICARE

## 2024-03-18 VITALS
HEART RATE: 79 BPM | DIASTOLIC BLOOD PRESSURE: 72 MMHG | SYSTOLIC BLOOD PRESSURE: 130 MMHG | RESPIRATION RATE: 18 BRPM | TEMPERATURE: 97.3 F | OXYGEN SATURATION: 97 %

## 2024-03-18 DIAGNOSIS — J45.50 SEVERE PERSISTENT ASTHMA, UNSPECIFIED WHETHER COMPLICATED: Primary | ICD-10-CM

## 2024-03-18 PROCEDURE — 6360000002 HC RX W HCPCS

## 2024-03-18 PROCEDURE — 96372 THER/PROPH/DIAG INJ SC/IM: CPT

## 2024-03-18 RX ADMIN — MEPOLIZUMAB 100 MG: 100 INJECTION, POWDER, FOR SOLUTION SUBCUTANEOUS at 14:45

## 2024-03-18 ASSESSMENT — PAIN SCALES - GENERAL: PAINLEVEL_OUTOF10: 6

## 2024-03-18 NOTE — PROGRESS NOTES
[facility] Progress Note    Date: 2024    Name: Katie Jordan    MRN: [id number]         : 1945      Nucala 100 mg injection every 28 days    Ms. Jordan was assessed and education was provided. Denies any problems with previous injections.    Ms. Jordan's vitals were reviewed and patient was observed for 5 minutes prior to treatment.   Visit Vitals    Patient Vitals for the past 12 hrs:   Temp Pulse Resp BP SpO2   24 1515 -- -- -- 130/72 --   24 1445 -- -- -- (!) 142/76 --   24 1415 97.3 °F (36.3 °C) 79 18 (!) 140/72 97 %     Nucala 100 mg/1 ml was administered subcutaneously in RUQ of abdomen. No irritation or drainage noted from site, band aid applied.     Ms. Jordan tolerated well, and had no complaints.  Patient armband removed and shredded.  Request to be observed for 30 minutes post injection.  Tolerated without any complications or reactions.     Ms. Jordan was [facility] in stable condition at 1518. She is to follow up on 04/15/2024 at 1400 for her next appointment.    Zuleima Chen RN  2024

## 2024-04-15 ENCOUNTER — HOSPITAL ENCOUNTER (OUTPATIENT)
Facility: HOSPITAL | Age: 79
Setting detail: INFUSION SERIES
Discharge: HOME | End: 2024-04-15
Payer: MEDICARE

## 2024-04-15 VITALS
SYSTOLIC BLOOD PRESSURE: 122 MMHG | RESPIRATION RATE: 18 BRPM | OXYGEN SATURATION: 95 % | TEMPERATURE: 97.8 F | DIASTOLIC BLOOD PRESSURE: 68 MMHG | HEART RATE: 85 BPM

## 2024-04-15 DIAGNOSIS — J45.50 SEVERE PERSISTENT ASTHMA, UNSPECIFIED WHETHER COMPLICATED: Primary | ICD-10-CM

## 2024-04-15 PROCEDURE — 96372 THER/PROPH/DIAG INJ SC/IM: CPT

## 2024-04-15 PROCEDURE — 2580000003 HC RX 258: Performed by: INTERNAL MEDICINE

## 2024-04-15 PROCEDURE — 6360000002 HC RX W HCPCS: Performed by: INTERNAL MEDICINE

## 2024-04-15 RX ORDER — FLUTICASONE PROPIONATE 50 MCG
1 SPRAY, SUSPENSION (ML) NASAL DAILY
COMMUNITY

## 2024-04-15 RX ORDER — ACETAMINOPHEN 325 MG/1
650 TABLET ORAL
OUTPATIENT
Start: 2024-04-15

## 2024-04-15 RX ORDER — ALBUTEROL SULFATE 90 UG/1
4 AEROSOL, METERED RESPIRATORY (INHALATION) PRN
Status: DISCONTINUED | OUTPATIENT
Start: 2024-04-15 | End: 2024-04-15

## 2024-04-15 RX ORDER — ACETAMINOPHEN 325 MG/1
650 TABLET ORAL
Status: DISCONTINUED | OUTPATIENT
Start: 2024-04-15 | End: 2024-04-16 | Stop reason: HOSPADM

## 2024-04-15 RX ORDER — ACETAMINOPHEN 325 MG/1
650 TABLET ORAL
OUTPATIENT
Start: 2024-05-13

## 2024-04-15 RX ORDER — ONDANSETRON 2 MG/ML
8 INJECTION INTRAMUSCULAR; INTRAVENOUS
OUTPATIENT
Start: 2024-05-13

## 2024-04-15 RX ORDER — DIPHENHYDRAMINE HYDROCHLORIDE 50 MG/ML
50 INJECTION INTRAMUSCULAR; INTRAVENOUS
OUTPATIENT
Start: 2024-04-15

## 2024-04-15 RX ORDER — SODIUM CHLORIDE 9 MG/ML
INJECTION, SOLUTION INTRAVENOUS CONTINUOUS
OUTPATIENT
Start: 2024-04-15

## 2024-04-15 RX ORDER — EPINEPHRINE 1 MG/ML
0.3 INJECTION, SOLUTION, CONCENTRATE INTRAVENOUS PRN
OUTPATIENT
Start: 2024-05-13

## 2024-04-15 RX ORDER — ONDANSETRON 2 MG/ML
8 INJECTION INTRAMUSCULAR; INTRAVENOUS
Status: DISCONTINUED | OUTPATIENT
Start: 2024-04-15 | End: 2024-04-16 | Stop reason: HOSPADM

## 2024-04-15 RX ORDER — ALBUTEROL SULFATE 90 UG/1
4 AEROSOL, METERED RESPIRATORY (INHALATION) PRN
OUTPATIENT
Start: 2024-04-15

## 2024-04-15 RX ORDER — EPINEPHRINE 1 MG/ML
0.3 INJECTION, SOLUTION, CONCENTRATE INTRAVENOUS PRN
OUTPATIENT
Start: 2024-04-15

## 2024-04-15 RX ORDER — SODIUM CHLORIDE 9 MG/ML
INJECTION, SOLUTION INTRAVENOUS CONTINUOUS
OUTPATIENT
Start: 2024-05-13

## 2024-04-15 RX ORDER — EPINEPHRINE 1 MG/ML
0.3 INJECTION, SOLUTION, CONCENTRATE INTRAVENOUS PRN
Status: DISCONTINUED | OUTPATIENT
Start: 2024-04-15 | End: 2024-04-15

## 2024-04-15 RX ORDER — ONDANSETRON 2 MG/ML
8 INJECTION INTRAMUSCULAR; INTRAVENOUS
OUTPATIENT
Start: 2024-04-15

## 2024-04-15 RX ORDER — SODIUM CHLORIDE 9 MG/ML
INJECTION, SOLUTION INTRAVENOUS CONTINUOUS
Status: DISCONTINUED | OUTPATIENT
Start: 2024-04-15 | End: 2024-04-16 | Stop reason: HOSPADM

## 2024-04-15 RX ORDER — ALBUTEROL SULFATE 90 UG/1
4 AEROSOL, METERED RESPIRATORY (INHALATION) PRN
OUTPATIENT
Start: 2024-05-13

## 2024-04-15 RX ORDER — CETIRIZINE HYDROCHLORIDE 10 MG/1
10 TABLET ORAL DAILY
COMMUNITY

## 2024-04-15 RX ORDER — DIPHENHYDRAMINE HYDROCHLORIDE 50 MG/ML
50 INJECTION INTRAMUSCULAR; INTRAVENOUS
OUTPATIENT
Start: 2024-05-13

## 2024-04-15 RX ORDER — DIPHENHYDRAMINE HYDROCHLORIDE 50 MG/ML
50 INJECTION INTRAMUSCULAR; INTRAVENOUS
Status: DISCONTINUED | OUTPATIENT
Start: 2024-04-15 | End: 2024-04-15

## 2024-04-15 RX ADMIN — WATER 100 MG: 1 INJECTION INTRAMUSCULAR; INTRAVENOUS; SUBCUTANEOUS at 14:37

## 2024-04-15 ASSESSMENT — PAIN SCALES - GENERAL: PAINLEVEL_OUTOF10: 6

## 2024-04-15 ASSESSMENT — PAIN DESCRIPTION - LOCATION: LOCATION: FOOT

## 2024-04-15 ASSESSMENT — PAIN DESCRIPTION - DESCRIPTORS: DESCRIPTORS: ACHING

## 2024-04-15 ASSESSMENT — PAIN DESCRIPTION - ORIENTATION: ORIENTATION: LEFT

## 2024-04-15 NOTE — PROGRESS NOTES
Name: Katie Jordan    MRN: [id number]         : 1945    NUCALA 100 MG every 28 days       Ms. Jordan arrived to [facility] at 2:03 PM, ambulatory.She denied any complications from her last Nucala injection. She stated that she just got over a respiratory infection. She was on Prednisone and an antibiotic that have both been stopped. She stated that she is now taking Zyrtec and Flonase, and wanted to make sure there were no medication reactions with any of these 4 medicines with Nucala. After this author  researched this with HEMANTH Hawley RN, patient was reassured that there were no medication reactions.     Ms. Jordan was assessed and education was provided.     Ms. Jordan's vitals were reviewed.  Vitals:    04/15/24 1418   BP: 122/68   Pulse: 85   Resp: 18   Temp: 97.8 °F (36.6 °C)   SpO2: 95%       Nucala 100 mg was administered as ordered SQ in patient's Abdomen, LLQ. No bleedng or irritation noted. Band aid applied.     Ms. Jordan tolerated well, and had no complaints.     Patient remained for 20 minutes post injection for observation as per her request.  She denied any adverse reactions/complications. No complaints.     Pt's armband removed & shredded.    Ms. Jordan was [facility] in stable condition at 3:10 PM.  She is to return on 24 at 2 PM for her next appointment.    Felisha Campos RN  April 15, 2024

## 2024-05-16 ENCOUNTER — HOSPITAL ENCOUNTER (OUTPATIENT)
Facility: HOSPITAL | Age: 79
Setting detail: INFUSION SERIES
Discharge: HOME | End: 2024-05-16
Payer: MEDICARE

## 2024-05-16 VITALS
SYSTOLIC BLOOD PRESSURE: 137 MMHG | TEMPERATURE: 98 F | RESPIRATION RATE: 18 BRPM | HEART RATE: 87 BPM | OXYGEN SATURATION: 98 % | DIASTOLIC BLOOD PRESSURE: 71 MMHG

## 2024-05-16 DIAGNOSIS — J45.50 SEVERE PERSISTENT ASTHMA, UNSPECIFIED WHETHER COMPLICATED: Primary | ICD-10-CM

## 2024-05-16 PROCEDURE — 96372 THER/PROPH/DIAG INJ SC/IM: CPT

## 2024-05-16 PROCEDURE — 6360000002 HC RX W HCPCS: Performed by: INTERNAL MEDICINE

## 2024-05-16 PROCEDURE — 2580000003 HC RX 258: Performed by: INTERNAL MEDICINE

## 2024-05-16 RX ORDER — EPINEPHRINE 1 MG/ML
0.3 INJECTION, SOLUTION, CONCENTRATE INTRAVENOUS PRN
OUTPATIENT
Start: 2024-06-13

## 2024-05-16 RX ORDER — DIPHENHYDRAMINE HYDROCHLORIDE 50 MG/ML
50 INJECTION INTRAMUSCULAR; INTRAVENOUS
OUTPATIENT
Start: 2024-06-13

## 2024-05-16 RX ORDER — SODIUM CHLORIDE 9 MG/ML
INJECTION, SOLUTION INTRAVENOUS CONTINUOUS
OUTPATIENT
Start: 2024-06-13

## 2024-05-16 RX ORDER — ALBUTEROL SULFATE 90 UG/1
4 AEROSOL, METERED RESPIRATORY (INHALATION) PRN
OUTPATIENT
Start: 2024-06-13

## 2024-05-16 RX ORDER — ACETAMINOPHEN 325 MG/1
650 TABLET ORAL
OUTPATIENT
Start: 2024-06-13

## 2024-05-16 RX ORDER — ONDANSETRON 2 MG/ML
8 INJECTION INTRAMUSCULAR; INTRAVENOUS
OUTPATIENT
Start: 2024-06-13

## 2024-05-16 RX ADMIN — WATER 100 MG: 1 INJECTION INTRAMUSCULAR; INTRAVENOUS; SUBCUTANEOUS at 14:43

## 2024-05-16 ASSESSMENT — PAIN SCALES - GENERAL: PAINLEVEL_OUTOF10: 7

## 2024-05-16 ASSESSMENT — PAIN DESCRIPTION - LOCATION: LOCATION: GENERALIZED

## 2024-05-16 NOTE — PROGRESS NOTES
Name: Katie Jordan    MRN: [id number]         : 1945    NUCALA       Ms. Jordan arrived to [facility] at 2:03 PM, ambulatory. She denied any complications/adverse effects from her previous Nucala injection.     Ms. Jordan was assessed and education was provided.     Ms. Jordan's vitals were reviewed.  Vitals:    24 1415   BP: 137/71   Pulse: 87   Resp: 18   Temp: 98 °F (36.7 °C)   SpO2: 98%       Nucala 100 mg was administered as ordered SQ in patient's  abdomen/ right lower quadrant. No bleeding or irritation noted. Band aid applied. Patient tolerated well. No complaints.     Patient remained for 20 minutes post injection for observation as per her request. She denied any adverse reactions/complications. No complaints.     Pt's armband removed & shredded.    Ms. Jordan was [facility] in stable condition at 3:05 PM.  She is to return on 24 at 2 PM for her next Nucala injection.     Felisha Campos RN  May 16, 2024

## 2024-06-13 ENCOUNTER — HOSPITAL ENCOUNTER (OUTPATIENT)
Facility: HOSPITAL | Age: 79
Setting detail: INFUSION SERIES
Discharge: HOME | End: 2024-06-13
Payer: MEDICARE

## 2024-06-13 VITALS
DIASTOLIC BLOOD PRESSURE: 72 MMHG | SYSTOLIC BLOOD PRESSURE: 118 MMHG | HEART RATE: 77 BPM | OXYGEN SATURATION: 97 % | TEMPERATURE: 97.5 F | RESPIRATION RATE: 18 BRPM

## 2024-06-13 DIAGNOSIS — J45.50 SEVERE PERSISTENT ASTHMA, UNSPECIFIED WHETHER COMPLICATED: Primary | ICD-10-CM

## 2024-06-13 PROCEDURE — 96372 THER/PROPH/DIAG INJ SC/IM: CPT

## 2024-06-13 PROCEDURE — 2580000003 HC RX 258: Performed by: INTERNAL MEDICINE

## 2024-06-13 PROCEDURE — 6360000002 HC RX W HCPCS: Performed by: INTERNAL MEDICINE

## 2024-06-13 RX ORDER — ACETAMINOPHEN 325 MG/1
650 TABLET ORAL
OUTPATIENT
Start: 2024-07-11

## 2024-06-13 RX ORDER — DIPHENHYDRAMINE HYDROCHLORIDE 50 MG/ML
50 INJECTION INTRAMUSCULAR; INTRAVENOUS
OUTPATIENT
Start: 2024-07-11

## 2024-06-13 RX ORDER — ONDANSETRON 2 MG/ML
8 INJECTION INTRAMUSCULAR; INTRAVENOUS
OUTPATIENT
Start: 2024-07-11

## 2024-06-13 RX ORDER — SODIUM CHLORIDE 9 MG/ML
INJECTION, SOLUTION INTRAVENOUS CONTINUOUS
OUTPATIENT
Start: 2024-07-11

## 2024-06-13 RX ORDER — EPINEPHRINE 1 MG/ML
0.3 INJECTION, SOLUTION, CONCENTRATE INTRAVENOUS PRN
OUTPATIENT
Start: 2024-07-11

## 2024-06-13 RX ORDER — ALBUTEROL SULFATE 90 UG/1
4 AEROSOL, METERED RESPIRATORY (INHALATION) PRN
OUTPATIENT
Start: 2024-07-11

## 2024-06-13 RX ADMIN — WATER 100 MG: 1 INJECTION INTRAMUSCULAR; INTRAVENOUS; SUBCUTANEOUS at 15:02

## 2024-06-13 ASSESSMENT — PAIN DESCRIPTION - LOCATION: LOCATION: GENERALIZED

## 2024-06-13 ASSESSMENT — PAIN SCALES - GENERAL: PAINLEVEL_OUTOF10: 7

## 2024-06-13 NOTE — PROGRESS NOTES
[facility] Progress Note    Date: 2023    Name: Katie Jordan    MRN: [id number]         : 1945      Nucala 100 mg injection every 28 days      Ms. Jordan was assessed and education was provided. Denies any problems with previous injection.   Will administer injection in abdomen today per request      Ms. Jordan's vitals were reviewed and patient was observed for 5 minutes prior to treatment.   Visit Vitals  /74    Pulse 81   Temp 98.2°F (36.8°C)   Resp 16   SpO2 98%     Took patients blood pressure manually, because she was concern about the elevation in the automatic and discomfort .   left  lower wrist used per request     Nucala 100 mg/1 ml was administered subcutaneously in upper left abdominal tissue  . No irritation or drainage noted from site,  no band aid applied.     Ms. Jordan tolerated well, and had no complaints.  Patient armband removed and shredded.  request to be observed for 30 minutes post injection.  Tolerated without any complications or reactions.     Ms. Jordan was [facility] in stable condition at 1515 She is scheduled to return 2024 @ 200 pm     Callie Hawley, RN, BSN

## 2024-07-11 ENCOUNTER — HOSPITAL ENCOUNTER (OUTPATIENT)
Facility: HOSPITAL | Age: 79
Setting detail: INFUSION SERIES
Discharge: HOME | End: 2024-07-11
Payer: MEDICARE

## 2024-07-11 VITALS
OXYGEN SATURATION: 96 % | RESPIRATION RATE: 18 BRPM | TEMPERATURE: 97.2 F | HEART RATE: 81 BPM | SYSTOLIC BLOOD PRESSURE: 128 MMHG | DIASTOLIC BLOOD PRESSURE: 70 MMHG

## 2024-07-11 DIAGNOSIS — J45.50 SEVERE PERSISTENT ASTHMA, UNSPECIFIED WHETHER COMPLICATED: Primary | ICD-10-CM

## 2024-07-11 PROCEDURE — 2580000003 HC RX 258: Performed by: INTERNAL MEDICINE

## 2024-07-11 PROCEDURE — 6360000002 HC RX W HCPCS: Performed by: INTERNAL MEDICINE

## 2024-07-11 PROCEDURE — 96372 THER/PROPH/DIAG INJ SC/IM: CPT

## 2024-07-11 RX ORDER — EPINEPHRINE 1 MG/ML
0.3 INJECTION, SOLUTION, CONCENTRATE INTRAVENOUS PRN
OUTPATIENT
Start: 2024-08-08

## 2024-07-11 RX ORDER — SODIUM CHLORIDE 9 MG/ML
INJECTION, SOLUTION INTRAVENOUS CONTINUOUS
OUTPATIENT
Start: 2024-08-08

## 2024-07-11 RX ORDER — ACETAMINOPHEN 325 MG/1
650 TABLET ORAL
OUTPATIENT
Start: 2024-08-08

## 2024-07-11 RX ORDER — DIPHENHYDRAMINE HYDROCHLORIDE 50 MG/ML
50 INJECTION INTRAMUSCULAR; INTRAVENOUS
OUTPATIENT
Start: 2024-08-08

## 2024-07-11 RX ORDER — ALBUTEROL SULFATE 90 UG/1
4 AEROSOL, METERED RESPIRATORY (INHALATION) PRN
OUTPATIENT
Start: 2024-08-08

## 2024-07-11 RX ORDER — ONDANSETRON 2 MG/ML
8 INJECTION INTRAMUSCULAR; INTRAVENOUS
OUTPATIENT
Start: 2024-08-08

## 2024-07-11 RX ADMIN — WATER 100 MG: 1 INJECTION INTRAMUSCULAR; INTRAVENOUS; SUBCUTANEOUS at 14:41

## 2024-07-11 ASSESSMENT — PAIN DESCRIPTION - LOCATION: LOCATION: GENERALIZED

## 2024-07-11 ASSESSMENT — PAIN SCALES - GENERAL: PAINLEVEL_OUTOF10: 7

## 2024-07-11 NOTE — PROGRESS NOTES
Name: Katie Jordan    MRN: [id number]         : 1945      Ms. Jordan arrived to [facility] at 1415, ambulatory. She denied any complications/adverse effects from her previous Nucala injection.     Ms. Jordan was assessed and education was provided.     Ms. Jordan's vitals were reviewed.  Vitals:    24 1419   BP: 128/70   Pulse: 81   Resp: 18   Temp: 97.2 °F (36.2 °C)   SpO2: 96%         Nucala 100 mg was administered as ordered SQ in patient's LLQ abdomen. Band aid applied.     Ms. Jordan tolerated well without complaints.    She remained for 15 minutes per her request for observation. She had no complaints.     Pt's armband removed & shredded.    Ms. Jordan was [facility] in stable condition at 1500.  She is to return on 8/15/24 at 2 PM for her next Nucala appointment.    Feilsha Campos RN  2024

## 2024-08-15 ENCOUNTER — HOSPITAL ENCOUNTER (OUTPATIENT)
Facility: HOSPITAL | Age: 79
Setting detail: INFUSION SERIES
Discharge: HOME | End: 2024-08-15
Payer: MEDICARE

## 2024-08-15 VITALS
OXYGEN SATURATION: 97 % | SYSTOLIC BLOOD PRESSURE: 122 MMHG | DIASTOLIC BLOOD PRESSURE: 75 MMHG | TEMPERATURE: 97.8 F | HEART RATE: 75 BPM | RESPIRATION RATE: 18 BRPM

## 2024-08-15 DIAGNOSIS — J45.50 SEVERE PERSISTENT ASTHMA, UNSPECIFIED WHETHER COMPLICATED: Primary | ICD-10-CM

## 2024-08-15 PROCEDURE — 2580000003 HC RX 258: Performed by: INTERNAL MEDICINE

## 2024-08-15 PROCEDURE — 96372 THER/PROPH/DIAG INJ SC/IM: CPT

## 2024-08-15 PROCEDURE — 6360000002 HC RX W HCPCS: Performed by: INTERNAL MEDICINE

## 2024-08-15 RX ORDER — ACETAMINOPHEN 325 MG/1
650 TABLET ORAL
OUTPATIENT
Start: 2024-09-12

## 2024-08-15 RX ORDER — EPINEPHRINE 1 MG/ML
0.3 INJECTION, SOLUTION, CONCENTRATE INTRAVENOUS PRN
OUTPATIENT
Start: 2024-09-12

## 2024-08-15 RX ORDER — ALBUTEROL SULFATE 90 UG/1
4 AEROSOL, METERED RESPIRATORY (INHALATION) PRN
OUTPATIENT
Start: 2024-09-12

## 2024-08-15 RX ORDER — ONDANSETRON 2 MG/ML
8 INJECTION INTRAMUSCULAR; INTRAVENOUS
OUTPATIENT
Start: 2024-09-12

## 2024-08-15 RX ORDER — DIPHENHYDRAMINE HYDROCHLORIDE 50 MG/ML
50 INJECTION INTRAMUSCULAR; INTRAVENOUS
OUTPATIENT
Start: 2024-09-12

## 2024-08-15 RX ORDER — SODIUM CHLORIDE 9 MG/ML
INJECTION, SOLUTION INTRAVENOUS CONTINUOUS
OUTPATIENT
Start: 2024-09-12

## 2024-08-15 RX ADMIN — WATER 100 MG: 1 INJECTION INTRAMUSCULAR; INTRAVENOUS; SUBCUTANEOUS at 14:36

## 2024-08-15 ASSESSMENT — PAIN DESCRIPTION - LOCATION: LOCATION: GENERALIZED

## 2024-08-15 ASSESSMENT — PAIN SCALES - GENERAL: PAINLEVEL_OUTOF10: 7

## 2024-08-15 NOTE — PROGRESS NOTES
Name: Katie Jordan    MRN: [id number]         : 1945    Nucala       Ms. Jordan arrived to [facility] at 1415, ambulatory.  She denied any complications from her previous Nucala injection.     Ms. Jordan was assessed and education was provided.     Ms. Jordan's vitals were reviewed.  Vitals:    08/15/24 1427   BP: 122/75   Pulse: 75   Resp: 18   Temp: 97.8 °F (36.6 °C)   SpO2: 97%     Nucala 100 mg was administered as ordered SQ in patient's LLQ abdomen. Band aid applied.      Ms. Jordan tolerated well without complaints.     She remained for 15 minutes for observation post injection per her request . She had no complaints of any allergic reaction.      Pt's armband removed & shredded.    Ms. Jordan was [facility] in stable condition at 1450.  She is to return on 24 at 1400 for her next Nucala appointment.    Felisha Campos RN  August 15, 2024

## 2024-09-12 ENCOUNTER — HOSPITAL ENCOUNTER (OUTPATIENT)
Facility: HOSPITAL | Age: 79
Setting detail: INFUSION SERIES
Discharge: HOME | End: 2024-09-12
Payer: MEDICARE

## 2024-09-12 VITALS
DIASTOLIC BLOOD PRESSURE: 62 MMHG | RESPIRATION RATE: 18 BRPM | SYSTOLIC BLOOD PRESSURE: 128 MMHG | TEMPERATURE: 97.4 F | HEART RATE: 81 BPM | OXYGEN SATURATION: 94 %

## 2024-09-12 DIAGNOSIS — J45.50 SEVERE PERSISTENT ASTHMA, UNSPECIFIED WHETHER COMPLICATED: Primary | ICD-10-CM

## 2024-09-12 PROCEDURE — 6360000002 HC RX W HCPCS: Performed by: INTERNAL MEDICINE

## 2024-09-12 PROCEDURE — 2580000003 HC RX 258: Performed by: INTERNAL MEDICINE

## 2024-09-12 PROCEDURE — 96372 THER/PROPH/DIAG INJ SC/IM: CPT

## 2024-09-12 RX ORDER — SODIUM CHLORIDE 9 MG/ML
INJECTION, SOLUTION INTRAVENOUS CONTINUOUS
OUTPATIENT
Start: 2024-10-10

## 2024-09-12 RX ORDER — ONDANSETRON 2 MG/ML
8 INJECTION INTRAMUSCULAR; INTRAVENOUS
OUTPATIENT
Start: 2024-10-10

## 2024-09-12 RX ORDER — EPINEPHRINE 1 MG/ML
0.3 INJECTION, SOLUTION, CONCENTRATE INTRAVENOUS PRN
OUTPATIENT
Start: 2024-10-10

## 2024-09-12 RX ORDER — ALBUTEROL SULFATE 90 UG/1
4 INHALANT RESPIRATORY (INHALATION) PRN
OUTPATIENT
Start: 2024-10-10

## 2024-09-12 RX ORDER — DIPHENHYDRAMINE HYDROCHLORIDE 50 MG/ML
50 INJECTION INTRAMUSCULAR; INTRAVENOUS
OUTPATIENT
Start: 2024-10-10

## 2024-09-12 RX ORDER — ACETAMINOPHEN 325 MG/1
650 TABLET ORAL
OUTPATIENT
Start: 2024-10-10

## 2024-09-12 RX ADMIN — WATER 100 MG: 1 INJECTION INTRAMUSCULAR; INTRAVENOUS; SUBCUTANEOUS at 14:30

## 2024-09-12 ASSESSMENT — PAIN SCALES - GENERAL: PAINLEVEL_OUTOF10: 7

## 2024-09-12 ASSESSMENT — PAIN DESCRIPTION - DESCRIPTORS: DESCRIPTORS: ACHING

## 2024-09-12 ASSESSMENT — PAIN DESCRIPTION - LOCATION: LOCATION: GENERALIZED

## 2024-10-10 ENCOUNTER — HOSPITAL ENCOUNTER (OUTPATIENT)
Facility: HOSPITAL | Age: 79
Setting detail: INFUSION SERIES
End: 2024-10-10
Payer: MEDICARE

## 2024-10-11 ENCOUNTER — HOSPITAL ENCOUNTER (OUTPATIENT)
Facility: HOSPITAL | Age: 79
Setting detail: INFUSION SERIES
Discharge: HOME | End: 2024-10-11
Payer: MEDICARE

## 2024-10-11 VITALS
TEMPERATURE: 98.2 F | RESPIRATION RATE: 18 BRPM | SYSTOLIC BLOOD PRESSURE: 130 MMHG | HEART RATE: 79 BPM | DIASTOLIC BLOOD PRESSURE: 68 MMHG | OXYGEN SATURATION: 95 %

## 2024-10-11 DIAGNOSIS — J45.50 SEVERE PERSISTENT ASTHMA, UNSPECIFIED WHETHER COMPLICATED: Primary | ICD-10-CM

## 2024-10-11 PROCEDURE — 6360000002 HC RX W HCPCS: Performed by: INTERNAL MEDICINE

## 2024-10-11 PROCEDURE — 2580000003 HC RX 258: Performed by: INTERNAL MEDICINE

## 2024-10-11 PROCEDURE — 96372 THER/PROPH/DIAG INJ SC/IM: CPT

## 2024-10-11 RX ORDER — HYDROCHLOROTHIAZIDE 12.5 MG/1
12.5 CAPSULE ORAL DAILY
COMMUNITY
End: 2024-10-11 | Stop reason: ALTCHOICE

## 2024-10-11 RX ORDER — ACETAMINOPHEN 325 MG/1
650 TABLET ORAL
OUTPATIENT
Start: 2024-11-08

## 2024-10-11 RX ORDER — ONDANSETRON 2 MG/ML
8 INJECTION INTRAMUSCULAR; INTRAVENOUS
OUTPATIENT
Start: 2024-11-08

## 2024-10-11 RX ORDER — SODIUM CHLORIDE 9 MG/ML
INJECTION, SOLUTION INTRAVENOUS CONTINUOUS
OUTPATIENT
Start: 2024-11-08

## 2024-10-11 RX ORDER — MULTIVIT-MIN/IRON/FOLIC ACID/K 18-600-40
2000 CAPSULE ORAL DAILY
COMMUNITY

## 2024-10-11 RX ORDER — ASPIRIN 81 MG/1
81 TABLET, CHEWABLE ORAL DAILY
COMMUNITY

## 2024-10-11 RX ORDER — DIPHENHYDRAMINE HYDROCHLORIDE 50 MG/ML
50 INJECTION INTRAMUSCULAR; INTRAVENOUS
OUTPATIENT
Start: 2024-11-08

## 2024-10-11 RX ORDER — LANOLIN ALCOHOL/MO/W.PET/CERES
1000 CREAM (GRAM) TOPICAL DAILY
COMMUNITY

## 2024-10-11 RX ORDER — ALBUTEROL SULFATE 90 UG/1
4 INHALANT RESPIRATORY (INHALATION) PRN
OUTPATIENT
Start: 2024-11-08

## 2024-10-11 RX ORDER — EPINEPHRINE 1 MG/ML
0.3 INJECTION, SOLUTION, CONCENTRATE INTRAVENOUS PRN
OUTPATIENT
Start: 2024-11-08

## 2024-10-11 RX ADMIN — WATER 100 MG: 1 INJECTION INTRAMUSCULAR; INTRAVENOUS; SUBCUTANEOUS at 11:50

## 2024-10-11 ASSESSMENT — PAIN DESCRIPTION - LOCATION: LOCATION: KNEE

## 2024-10-11 ASSESSMENT — PAIN SCALES - GENERAL: PAINLEVEL_OUTOF10: 7

## 2024-10-11 NOTE — PROGRESS NOTES
[facility] Progress Note    Date: 2024    Name: Katie Jordan    MRN: [id number]         : 1945      Nucala 100 mg injection every 28 days      Ms. Jordan was assessed and education was provided. Denies any problems with previous injection.   Will administer injection in abdomen today per request      Ms. Jordan's vitals were reviewed and patient was observed for 5 minutes prior to treatment.   Visit Vitals  Patient Vitals for the past 12 hrs:   Temp Pulse Resp BP SpO2   10/11/24 1133 98.2 °F (36.8 °C) 79 18 130/68 95 %     Took patients blood pressure manually, due to pt request . Right upper arm.    Nucala 100 mg was administered subcutaneously in  LLQ abdomen. No irritation or drainage noted from site, band aid applied.     Ms. Jordan tolerated well, and had no complaints.  Patient armband removed and shredded.  request to be observed for 15 minutes post injection.  Tolerated without any complications or reactions.     Ms. Jordan was [facility] in stable condition at 1210. She is scheduled to return 2024 @ 1400 for next nucala.     Teresa Newby RN, BSN  10/11/2024  12:12PM

## 2024-11-11 ENCOUNTER — TELEPHONE (OUTPATIENT)
Facility: HOSPITAL | Age: 79
End: 2024-11-11

## 2024-11-11 DIAGNOSIS — J45.50 SEVERE PERSISTENT ASTHMA WITHOUT COMPLICATION: Primary | ICD-10-CM

## 2024-11-11 NOTE — TELEPHONE ENCOUNTER
I called the pt this morning to inform her of her referral to pulmonary rehab. The pt was expecting my call and already knew what we do in clinic. The pt scheduled her evaluation appt for 11/21/2024 at 12:30PM and was given directions to our location on campus. Pt was instructed to call the clinic if she is unable to find us prior to her appt.

## 2024-11-12 ENCOUNTER — HOSPITAL ENCOUNTER (OUTPATIENT)
Facility: HOSPITAL | Age: 79
Setting detail: INFUSION SERIES
End: 2024-11-12

## 2024-11-14 ENCOUNTER — HOSPITAL ENCOUNTER (OUTPATIENT)
Facility: HOSPITAL | Age: 79
Setting detail: INFUSION SERIES
Discharge: HOME | End: 2024-11-14
Payer: MEDICARE

## 2024-11-14 VITALS
RESPIRATION RATE: 18 BRPM | DIASTOLIC BLOOD PRESSURE: 70 MMHG | SYSTOLIC BLOOD PRESSURE: 132 MMHG | TEMPERATURE: 97.3 F | HEART RATE: 66 BPM | OXYGEN SATURATION: 98 %

## 2024-11-14 DIAGNOSIS — J45.50 SEVERE PERSISTENT ASTHMA, UNSPECIFIED WHETHER COMPLICATED: Primary | ICD-10-CM

## 2024-11-14 PROCEDURE — 96372 THER/PROPH/DIAG INJ SC/IM: CPT

## 2024-11-14 PROCEDURE — 2580000003 HC RX 258: Performed by: INTERNAL MEDICINE

## 2024-11-14 PROCEDURE — 6360000002 HC RX W HCPCS: Performed by: INTERNAL MEDICINE

## 2024-11-14 RX ORDER — DIPHENHYDRAMINE HYDROCHLORIDE 50 MG/ML
50 INJECTION INTRAMUSCULAR; INTRAVENOUS
OUTPATIENT
Start: 2024-12-05

## 2024-11-14 RX ORDER — SODIUM CHLORIDE 9 MG/ML
INJECTION, SOLUTION INTRAVENOUS CONTINUOUS
OUTPATIENT
Start: 2024-12-05

## 2024-11-14 RX ORDER — HYDROCORTISONE SODIUM SUCCINATE 100 MG/2ML
100 INJECTION INTRAMUSCULAR; INTRAVENOUS
OUTPATIENT
Start: 2024-12-05

## 2024-11-14 RX ORDER — ALBUTEROL SULFATE 90 UG/1
4 INHALANT RESPIRATORY (INHALATION) PRN
OUTPATIENT
Start: 2024-12-05

## 2024-11-14 RX ORDER — EPINEPHRINE 1 MG/ML
0.3 INJECTION, SOLUTION, CONCENTRATE INTRAVENOUS PRN
OUTPATIENT
Start: 2024-12-05

## 2024-11-14 RX ORDER — ACETAMINOPHEN 325 MG/1
650 TABLET ORAL
OUTPATIENT
Start: 2024-12-05

## 2024-11-14 RX ORDER — ONDANSETRON 2 MG/ML
8 INJECTION INTRAMUSCULAR; INTRAVENOUS
OUTPATIENT
Start: 2024-12-05

## 2024-11-14 RX ADMIN — WATER 100 MG: 1 INJECTION INTRAMUSCULAR; INTRAVENOUS; SUBCUTANEOUS at 14:20

## 2024-11-14 ASSESSMENT — PAIN DESCRIPTION - ORIENTATION: ORIENTATION: LEFT

## 2024-11-14 ASSESSMENT — PAIN DESCRIPTION - DESCRIPTORS: DESCRIPTORS: ACHING

## 2024-11-14 ASSESSMENT — PAIN DESCRIPTION - LOCATION: LOCATION: KNEE

## 2024-11-14 ASSESSMENT — PAIN SCALES - GENERAL: PAINLEVEL_OUTOF10: 8

## 2024-11-14 NOTE — PROGRESS NOTES
Name: Katie Jordan    MRN: [id number]         : 1945    NUCALA      Ms. Jordan arrived to \[facility]\"" at 1358, via wheelchair and with cane. She stated that her left knee was giving her \"a lot of pain today', rated it 7/10.     Ms. Jordan was assessed and education was provided.     Ms. Jordan's vitals were reviewed.  Vitals:    24 1400   BP: 132/70   Pulse: 66   Resp: 18   Temp: 97.3 °F (36.3 °C)   SpO2: 98%     Nucala 100 mg was administered as ordered SQ in patient's LLQ abdomen. Band aid applied.      Ms. Jordan tolerated the injection well, and she had no complaints.     She remained for 15 minutes for observation post injection. She had no complaints of any allergic reaction.      Pt's armband removed & shredded.    Ms. Jordan was [facility] in stable condition at 1450.  She is to return on 24 at 1400 for her next Nucala appointment.    Felisha Campos RN  2024

## 2024-11-21 ENCOUNTER — HOSPITAL ENCOUNTER (OUTPATIENT)
Facility: HOSPITAL | Age: 79
Setting detail: RECURRING SERIES
Discharge: HOME | End: 2024-11-24
Payer: MEDICARE

## 2024-11-21 VITALS — OXYGEN SATURATION: 92 %

## 2024-11-21 PROCEDURE — 94618 PULMONARY STRESS TESTING: CPT

## 2024-11-21 ASSESSMENT — PATIENT HEALTH QUESTIONNAIRE - PHQ9
4. FEELING TIRED OR HAVING LITTLE ENERGY: NEARLY EVERY DAY
2. FEELING DOWN, DEPRESSED OR HOPELESS: SEVERAL DAYS
7. TROUBLE CONCENTRATING ON THINGS, SUCH AS READING THE NEWSPAPER OR WATCHING TELEVISION: NOT AT ALL
3. TROUBLE FALLING OR STAYING ASLEEP: MORE THAN HALF THE DAYS
6. FEELING BAD ABOUT YOURSELF - OR THAT YOU ARE A FAILURE OR HAVE LET YOURSELF OR YOUR FAMILY DOWN: NOT AT ALL
SUM OF ALL RESPONSES TO PHQ QUESTIONS 1-9: 6
SUM OF ALL RESPONSES TO PHQ QUESTIONS 1-9: 6
5. POOR APPETITE OR OVEREATING: NOT AT ALL
1. LITTLE INTEREST OR PLEASURE IN DOING THINGS: NOT AT ALL
8. MOVING OR SPEAKING SO SLOWLY THAT OTHER PEOPLE COULD HAVE NOTICED. OR THE OPPOSITE, BEING SO FIGETY OR RESTLESS THAT YOU HAVE BEEN MOVING AROUND A LOT MORE THAN USUAL: NOT AT ALL
SUM OF ALL RESPONSES TO PHQ9 QUESTIONS 1 & 2: 1
10. IF YOU CHECKED OFF ANY PROBLEMS, HOW DIFFICULT HAVE THESE PROBLEMS MADE IT FOR YOU TO DO YOUR WORK, TAKE CARE OF THINGS AT HOME, OR GET ALONG WITH OTHER PEOPLE: EXTREMELY DIFFICULT
9. THOUGHTS THAT YOU WOULD BE BETTER OFF DEAD, OR OF HURTING YOURSELF: NOT AT ALL
SUM OF ALL RESPONSES TO PHQ QUESTIONS 1-9: 6
SUM OF ALL RESPONSES TO PHQ QUESTIONS 1-9: 6

## 2024-11-21 ASSESSMENT — PULMONARY FUNCTION TESTS
FVC (LITERS): 83
FEV1 (%PREDICTED): 91
FEV1/FVC: 80

## 2024-11-21 ASSESSMENT — EXERCISE STRESS TEST
PEAK_RPE: 4
PEAK_METS: 1.7
PEAK_BP: 151/86
PEAK_HR: 493

## 2024-11-21 ASSESSMENT — EJECTION FRACTION: EF_VALUE: 75

## 2024-11-21 ASSESSMENT — LIFESTYLE VARIABLES: ALCOHOL_USE: YES

## 2024-11-21 NOTE — PROGRESS NOTES
Katie Jordan #[id number] (CSN:[id number]) (79 y.o. F) (Adm: 11/21/24)  [facility]  Cardiac ITP    Flowsheet Row CARDIO PULMONARY REHAB from 11/21/2024 in [facility] PULMONARY REHAB   Treatment Diagnosis    Treatment Diagnosis 1 Asthma   Treatment Diagnosis 2 --   Referral Date 11/01/24   Significant Cardiovascular History Previous myocardial infarction, Previous PCI   Co-morbidities Auto-immune disease, Previous MI, Pulmonary disease   Oxygen Saturation / Titration    Stages of Change Preparation   Oxygen Assessment    Stages of Change Preparation   Oxygen Type --   Oxygen Compliant --   O2 Flow Rate (l/min) - Rest --   O2 Flow Rate (l/min) - Exercise --   O2 Flow Rate (l/min) - Sleep --   Breath Sounds --   O2 Sat Greater Than 90% --   Retired, Read Only Patients Liter Flow --   Individual Treatment Plan    ITP Visit Type Initial assessment   1st Date of Exercise 11/25/24   ITP Next Review Date 12/19/24   Visit #/Total Visits 1/36   EF% 75 %   FVC (Liters) 83 liters   FEV1 (%PRED) 91   FEV1/FVC 80   DLCO (mL/mmHg sec) --   Risk Stratification Moderate   Pulmonary ITP Exercise, Psychosocial, Nutrition, Education   Exercise    Ribeiro Total Score 50   Stages of Change Preparation   Exercise Test Six minute walk test   Distance Calculated in ft   Distance (Feet-Actual) --   Distance (miles) --   Distance (Feet-Calculated) --   Distance (meters) --   Peak    Peak /86   Peak RPE 4   Peak Mets 1.7   SpO2 91-97   O2 Flow Rate (l/min) --   Stops 1   SPO2 Range 91-97   DASI Total Score --   BMI (Calculated) --   FEV1 (%PRED) 91   FANNIE Total Score --   Exercise Prescription    Mode Track, Bike, Stepper, Ergometer, Resistance training   Frequency per week 2   Duration Per Session 60   Intensity METS       1.7   Progression slow   SpO2 92 %   O2 Device Room air   O2 Flow Rate (l/min) --   Retired, Read Only FIO2 (%) --   O2 Temperature --   Comments --   Symptoms with Exercise Shortness of breath, Leg pain   Target Heart 
met                  [] not met  [] progressing      3. Self monitoring    Encourage vaccinations  Shortness of breath: Educate on pursed lip breathing, self-monitoring, interval training.  increase/decrease/maintain weight, weigh daily in clinic, increase vegetable /fiber intake, decrease saturated fats, exercise at least 2 days per week, provide nutrition information, and schedule nutrition consult    30-60 days [x] initial  [] met                  [] not met  [] progressing    4. Improve mental health status  discuss anxiety/depression associated with lung disease  recognize patient improvements in ADLs, mobility, vital signs, mood and progress as they occur  30-60 days [x] initial  [] met                  [] not met  [] progressing      Would like to be able to swim again      Frequency / Duration: Patient to be seen 2-3 times per week for 12-18 weeks:        DEEPIKA SHORT, RT 11/21/2024 1:00 PM

## 2024-11-25 ENCOUNTER — HOSPITAL ENCOUNTER (OUTPATIENT)
Facility: HOSPITAL | Age: 79
Setting detail: RECURRING SERIES
Discharge: HOME | End: 2024-11-28
Payer: MEDICARE

## 2024-11-25 PROCEDURE — G0237 THERAPEUTIC PROCD STRG ENDUR: HCPCS

## 2024-11-25 NOTE — PROGRESS NOTES
Pulmonary/RT Note    Visit #        2/36       Katie Jordan is a 79 y.o. female presented today for pulmonary rehab. She states that there have been no changes in medication or health since the last visit.          She has a target heart rate of 104-119. She tolerated the exercise session well and has a pain level of 7 (Left Knee). Patient states RPE for session today was 5 and RPD was a 4. Today the pt did the following.        Nustep:10 min  Arm bike: 12 min  Breathing retraining/IS Instruction/Unit orientation: 20 min  Recovery and Monitoring:15 min             Physician available for emergencies: Travis SHORT, RT 11/25/2024 3:51 PM

## 2024-11-27 ENCOUNTER — APPOINTMENT (OUTPATIENT)
Facility: HOSPITAL | Age: 79
End: 2024-11-27
Payer: MEDICARE

## 2024-12-02 ENCOUNTER — HOSPITAL ENCOUNTER (OUTPATIENT)
Facility: HOSPITAL | Age: 79
Setting detail: RECURRING SERIES
Discharge: HOME | End: 2024-12-05
Payer: MEDICARE

## 2024-12-02 PROCEDURE — G0237 THERAPEUTIC PROCD STRG ENDUR: HCPCS

## 2024-12-02 NOTE — PROGRESS NOTES
Pulmonary/RT Note    Visit #        3/36       Katie Jordan is a 79 y.o. female presented today for pulmonary rehab. She states that there have been no changes in medication or health since the last visit.          She has a target heart rate of 104-119. She tolerated the exercise session well and has a pain level of 6 (knee pain/waiting on a steroid shot). Patient states RPE for session today was 14 and RPD was a 3. Today the pt did the following.        Nustep:10 min  Arm bike:13  min  Breathing retraining:15 min  Recovery and Monitoring:15 min             Physician available for emergencies: Génesis SHORT, RT 12/2/2024 3:25 PM

## 2024-12-05 ENCOUNTER — HOSPITAL ENCOUNTER (OUTPATIENT)
Facility: HOSPITAL | Age: 79
Setting detail: RECURRING SERIES
Discharge: HOME | End: 2024-12-08
Payer: MEDICARE

## 2024-12-05 PROCEDURE — G0237 THERAPEUTIC PROCD STRG ENDUR: HCPCS

## 2024-12-05 NOTE — PROGRESS NOTES
Pulmonary/RT Note    Visit #               Katie Jordan is a 79 y.o. female presented today for pulmonary rehab. She state that there have been no changes in medication or health since the last visit.          She has a target heart rate of 104-119. She tolerated the exercise session well and has a pain level of 5. Patient states RPE for session today was 6 and RPD was a 6. Today the pt did:      Nustep:0 min  Arm bike:  20 min  Walkin min  Resistance bands:  0 min   Breathing retraining: 15 min  Bodyweight: 0 min  Recovery: 15 min             Physician available for emergencies:Dr. Génesis Capellan, RT 2024 2:53 PM

## 2024-12-05 NOTE — PROGRESS NOTES
Pulmonary/RT Note    Visit #        11/36       Katie Jordan is a 79 y.o. female presented today for pulmonary rehab. There have been no changes in medication or health since the last visit.          He has a target heart rate of . He tolerated the exercise session well and has a pain level of 0. Patient states RPE for session today was 4 and RPD was a 5. Today the pt did the following.    Arm bike: 20 min  Breathing retraining: 15 min  Recovery and Monitoring:15 min             Physician available for emergencies: JAMILAH SHORT, RT 12/5/2024 3:27 PM

## 2024-12-09 ENCOUNTER — HOSPITAL ENCOUNTER (OUTPATIENT)
Facility: HOSPITAL | Age: 79
Setting detail: RECURRING SERIES
Discharge: HOME | End: 2024-12-12
Payer: MEDICARE

## 2024-12-09 PROCEDURE — G0237 THERAPEUTIC PROCD STRG ENDUR: HCPCS

## 2024-12-09 NOTE — PROGRESS NOTES
Pulmonary/RT Note    Visit #               Katie Jordan is a 79 y.o. female presented today for pulmonary rehab. She states that there have been no changes in medication or health since the last visit.          She has a target heart rate of 104-119. She tolerated the exercise session well and has a pain level of 5. Patient states RPE for session today was 14 and RPD was a 3. Today the pt did the following.    Arm bike: 15 min  Wallkin min  Breathing retraining:15 min  Recovery and Monitorin min             Physician available for emergencies: Mykel SHORT, RT 2024 2:50 PM      
Cessation Assessment    Stages of Change Other (comment)  [quit 30+ years ago]   Primary Tobacco Type --   Tobacco Use Status --   Quit --   Date Started --   Date Quit --   Quit Date Set --   # Cigarette Smoked/Day --   Smokeless Tobacco Use --   Amount --   Retired, Read Only Tobacco Use --   Tobacco Cessation Intervention    Smoking Cessation Referral --   Tobacco Adjunct Therapy --   Tobacco Cessation Education    Resource Information Provided --   Tobacco Triggers --   Tobacco Cessation Goals    Patient Target Goals --   Patient Treatment Goal --   Goal Status --   Progress Towards Goal --   Retired, Read Only Patient Stated Tobacco Goals --   Nutrition Assessment    Stages of Change Preparation   Nutrition Assessment Tool --   Retired, Read Only Nutrition Assessment Tool --   Rate Your Plate Total Score 57   Current Diet --   Barriers to Heart Healthy Diet --   Alcohol Yes   Type Wine   Retired, Read Only Type --   Amount (Drinks Per Week) 0   Height 1.676 m (5' 6\")   Weight 114.5 kg (252 lb 6.4 oz)   BMI 40.82   Waist Circumference (In) --   % Body Fat --   Lipid Management Assessment    Stages of Change Preparation   Date Lipids Drawn --   Total --   HDL --   LDL --   Triglycerides --   Weight Management Assessment    Stages of Change --   Height 1.676 m (5' 6\")   Weight 114.5 kg (252 lb 6.4 oz)   BMI 40.82   Waist Circumference (In) --   % Body Fat Loss/Gain --   Current Weight Loss/Gain Strategies --   Barriers to Weight Loss/Gain --   Nutrition Intervention    Dietitian Consult No   Staff/Patient Discussion Yes   Supplemental Nutrition --   Diabetes Education Referral No   Lipid Clinic Referral No   Nutrition Education    Education Low fat saturated diet, Signs/symptoms/treatment of hypo/hyperglycemia, DM & CAD relationship, Low sodium diet, Limit added sugars, Overall healthy eating pattern that emphasizes vegetables, fruits, whole grains, healthy proteins and non-tropical oils, Reduced calorie/portion

## 2024-12-10 ENCOUNTER — APPOINTMENT (OUTPATIENT)
Facility: HOSPITAL | Age: 79
End: 2024-12-10
Payer: MEDICARE

## 2024-12-12 ENCOUNTER — HOSPITAL ENCOUNTER (OUTPATIENT)
Facility: HOSPITAL | Age: 79
Setting detail: RECURRING SERIES
Discharge: HOME | End: 2024-12-15
Payer: MEDICARE

## 2024-12-12 ENCOUNTER — HOSPITAL ENCOUNTER (OUTPATIENT)
Facility: HOSPITAL | Age: 79
Setting detail: INFUSION SERIES
Discharge: HOME | End: 2024-12-12
Payer: MEDICARE

## 2024-12-12 VITALS
SYSTOLIC BLOOD PRESSURE: 124 MMHG | TEMPERATURE: 97.3 F | DIASTOLIC BLOOD PRESSURE: 68 MMHG | OXYGEN SATURATION: 98 % | RESPIRATION RATE: 18 BRPM | HEART RATE: 74 BPM

## 2024-12-12 DIAGNOSIS — J45.50 SEVERE PERSISTENT ASTHMA, UNSPECIFIED WHETHER COMPLICATED: Primary | ICD-10-CM

## 2024-12-12 PROCEDURE — G0237 THERAPEUTIC PROCD STRG ENDUR: HCPCS

## 2024-12-12 PROCEDURE — 6360000002 HC RX W HCPCS: Performed by: INTERNAL MEDICINE

## 2024-12-12 PROCEDURE — 96372 THER/PROPH/DIAG INJ SC/IM: CPT

## 2024-12-12 PROCEDURE — 2580000003 HC RX 258: Performed by: INTERNAL MEDICINE

## 2024-12-12 RX ORDER — ONDANSETRON 2 MG/ML
8 INJECTION INTRAMUSCULAR; INTRAVENOUS
OUTPATIENT
Start: 2025-01-09

## 2024-12-12 RX ORDER — ALBUTEROL SULFATE 90 UG/1
4 INHALANT RESPIRATORY (INHALATION) PRN
OUTPATIENT
Start: 2025-01-09

## 2024-12-12 RX ORDER — HYDROCORTISONE SODIUM SUCCINATE 100 MG/2ML
100 INJECTION INTRAMUSCULAR; INTRAVENOUS
OUTPATIENT
Start: 2025-01-09

## 2024-12-12 RX ORDER — EPINEPHRINE 1 MG/ML
0.3 INJECTION, SOLUTION INTRAMUSCULAR; SUBCUTANEOUS PRN
OUTPATIENT
Start: 2025-01-09

## 2024-12-12 RX ORDER — ACETAMINOPHEN 325 MG/1
650 TABLET ORAL
OUTPATIENT
Start: 2025-01-09

## 2024-12-12 RX ORDER — DIPHENHYDRAMINE HYDROCHLORIDE 50 MG/ML
50 INJECTION INTRAMUSCULAR; INTRAVENOUS
OUTPATIENT
Start: 2025-01-09

## 2024-12-12 RX ORDER — SODIUM CHLORIDE 9 MG/ML
INJECTION, SOLUTION INTRAVENOUS CONTINUOUS
OUTPATIENT
Start: 2025-01-09

## 2024-12-12 RX ADMIN — WATER 100 MG: 1 INJECTION INTRAMUSCULAR; INTRAVENOUS; SUBCUTANEOUS at 14:22

## 2024-12-12 ASSESSMENT — PAIN DESCRIPTION - ORIENTATION: ORIENTATION: LEFT

## 2024-12-12 ASSESSMENT — PAIN DESCRIPTION - LOCATION: LOCATION: KNEE

## 2024-12-12 ASSESSMENT — PAIN DESCRIPTION - DESCRIPTORS: DESCRIPTORS: ACHING

## 2024-12-12 ASSESSMENT — PAIN SCALES - GENERAL: PAINLEVEL_OUTOF10: 5

## 2024-12-12 NOTE — PROGRESS NOTES
Name: Katie Jordan    MRN: [id number]         : 1945    NUCALA      Ms. Jordan arrived to [facility] at 1412 via wheelchair.    Ms. Jordan was assessed and education was provided. She denied any complications from her previous Nucala injection.     Ms. Jordan's vitals were reviewed.  Vitals:    24 1417   BP: 124/68   Pulse: 74   Resp: 18   Temp: 97.3 °F (36.3 °C)   SpO2: 98%       Nucala 100 mg was administered as ordered SQ in patient's LLQ abdomen. Band aid applied.      Ms. Jordan tolerated the injection well, and she had no complaints.     She remained for 15 minutes for observation post injection. She had no complaints of any allergic reaction.      Pt's armband removed & shredded.    [facility] at 1505.  She is to return on 25 at 1400 for her next Nucala appointment.    Felisha Campos RN  2024

## 2024-12-12 NOTE — PROGRESS NOTES
Pulmonary/RT Note    Visit #               Katie Jordan is a 79 y.o. female presented today for pulmonary rehab. She states that there have been no changes in medication or health since the last visit.          She has a target heart rate of 104-119. She tolerated the exercise session well and has a pain level of 6. Patient states RPE for session today was 4 and RPD was a 4. Today the pt did the following.  Nustep: 10 mins  Arm bike: 15 min  Wallkin  min  Breathing retraining: 15 min  Recovery and Monitoring:15 min             Physician available for emergencies:     Mykel SHORT, RT 2024 3:39 PM

## 2024-12-16 ENCOUNTER — HOSPITAL ENCOUNTER (OUTPATIENT)
Facility: HOSPITAL | Age: 79
Setting detail: RECURRING SERIES
Discharge: HOME | End: 2024-12-19
Payer: MEDICARE

## 2024-12-16 PROCEDURE — G0237 THERAPEUTIC PROCD STRG ENDUR: HCPCS

## 2024-12-16 NOTE — PROGRESS NOTES
Pulmonary/RT Note    Visit #        7/36       Katie Jordan is a 79 y.o. female presented today for pulmonary rehab. She states that there have been no changes in medication or health since the last visit.          She has a target heart rate of 104-119. She tolerated the exercise session well and has a pain level of 8. Patient states RPE for session today was 4 and RPD was a 4. Today the pt did the following:    Nustep:15 min  Arm bike: 15 min  Breathing retraining:15 min  Recovery and Monitoring:15 min             Physician available for emergencies: Travis SHORT, RT 12/16/2024 3:21 PM

## 2024-12-18 ENCOUNTER — APPOINTMENT (OUTPATIENT)
Facility: HOSPITAL | Age: 79
End: 2024-12-18
Payer: MEDICARE

## 2024-12-19 VITALS — OXYGEN SATURATION: 92 %

## 2024-12-19 ASSESSMENT — LIFESTYLE VARIABLES: ALCOHOL_USE: YES

## 2024-12-19 ASSESSMENT — EXERCISE STRESS TEST
PEAK_BP: 151/86
PEAK_HR: 493
PEAK_RPE: 4
PEAK_METS: 1.7

## 2024-12-19 ASSESSMENT — PULMONARY FUNCTION TESTS
FEV1 (%PREDICTED): 91
FVC (LITERS): 83
FEV1/FVC: 80

## 2024-12-19 ASSESSMENT — EJECTION FRACTION: EF_VALUE: 75

## 2024-12-19 NOTE — PROGRESS NOTES
Katie Jordan #[id number] (CSN:[id number]) (79 y.o. F) (Adm: 12/16/24)  [facility]  Pulmonary ITP    Flowsheet Row CARDIO PULMONARY REHAB from 12/16/2024 in [facility] PULMONARY REHAB   Treatment Diagnosis    Treatment Diagnosis 1 Asthma   Treatment Diagnosis 2 --   Referral Date 11/01/24   Significant Cardiovascular History Previous myocardial infarction, Previous PCI   Co-morbidities Auto-immune disease, Previous MI, Pulmonary disease   Oxygen Saturation / Titration    Stages of Change Action   Oxygen Assessment    Stages of Change Action   Oxygen Type --   Oxygen Compliant --   O2 Flow Rate (l/min) - Rest --   O2 Flow Rate (l/min) - Exercise --   O2 Flow Rate (l/min) - Sleep --   Breath Sounds --   O2 Sat Greater Than 90% --   Retired, Read Only Patients Liter Flow --   Individual Treatment Plan    ITP Visit Type Re-assessment   1st Date of Exercise 11/25/24   ITP Next Review Date 01/16/25   Visit #/Total Visits 7/36   EF% 75 %   FVC (Liters) 83 liters   FEV1 (%PRED) 91   FEV1/FVC 80   DLCO (mL/mmHg sec) --   Risk Stratification Moderate   Pulmonary ITP Exercise, Psychosocial, Nutrition, Education   Exercise    Ribeiro Total Score --   Stages of Change Preparation   Exercise Test Six minute walk test   Distance Calculated in ft   Distance (Feet-Actual) --   Distance (miles) --   Distance (Feet-Calculated) --   Distance (meters) --   Peak    Peak /86   Peak RPE 4   Peak Mets 1.7   SpO2 91-97   O2 Flow Rate (l/min) --   Stops 1   SPO2 Range 91-97   DASI Total Score --   BMI (Calculated) --   FEV1 (%PRED) 91   FANNIE Total Score --   Exercise Prescription    Mode Track, Bike, Stepper, Ergometer, Resistance training   Frequency per week 2   Duration Per Session 60   Intensity METS       1.7   Progression slow   SpO2 92 %   O2 Device Room air   O2 Flow Rate (l/min) --   Retired, Read Only FIO2 (%) --   O2 Temperature --   Comments --   Symptoms with Exercise Shortness of breath, Leg pain   Target Heart Rate 104-119

## 2024-12-23 ENCOUNTER — HOSPITAL ENCOUNTER (OUTPATIENT)
Facility: HOSPITAL | Age: 79
Setting detail: RECURRING SERIES
End: 2024-12-23
Payer: MEDICARE

## 2024-12-30 ENCOUNTER — APPOINTMENT (OUTPATIENT)
Facility: HOSPITAL | Age: 79
End: 2024-12-30
Payer: MEDICARE

## 2025-01-06 ENCOUNTER — APPOINTMENT (OUTPATIENT)
Facility: HOSPITAL | Age: 80
End: 2025-01-06
Payer: MEDICARE

## 2025-01-08 ENCOUNTER — APPOINTMENT (OUTPATIENT)
Facility: HOSPITAL | Age: 80
End: 2025-01-08
Payer: MEDICARE

## 2025-01-09 ENCOUNTER — HOSPITAL ENCOUNTER (OUTPATIENT)
Facility: HOSPITAL | Age: 80
Setting detail: INFUSION SERIES
Discharge: HOME | End: 2025-01-09
Payer: MEDICARE

## 2025-01-09 VITALS
HEART RATE: 72 BPM | RESPIRATION RATE: 18 BRPM | DIASTOLIC BLOOD PRESSURE: 65 MMHG | SYSTOLIC BLOOD PRESSURE: 126 MMHG | TEMPERATURE: 97.2 F | OXYGEN SATURATION: 96 %

## 2025-01-09 DIAGNOSIS — J45.50 SEVERE PERSISTENT ASTHMA, UNSPECIFIED WHETHER COMPLICATED: Primary | ICD-10-CM

## 2025-01-09 PROCEDURE — 2500000003 HC RX 250 WO HCPCS: Performed by: INTERNAL MEDICINE

## 2025-01-09 PROCEDURE — 96372 THER/PROPH/DIAG INJ SC/IM: CPT

## 2025-01-09 PROCEDURE — 6360000002 HC RX W HCPCS: Performed by: INTERNAL MEDICINE

## 2025-01-09 RX ORDER — ALBUTEROL SULFATE 90 UG/1
4 INHALANT RESPIRATORY (INHALATION) PRN
OUTPATIENT
Start: 2025-02-06

## 2025-01-09 RX ORDER — ONDANSETRON 2 MG/ML
8 INJECTION INTRAMUSCULAR; INTRAVENOUS
OUTPATIENT
Start: 2025-02-06

## 2025-01-09 RX ORDER — SODIUM CHLORIDE 9 MG/ML
INJECTION, SOLUTION INTRAVENOUS CONTINUOUS
OUTPATIENT
Start: 2025-02-06

## 2025-01-09 RX ORDER — ACETAMINOPHEN 325 MG/1
650 TABLET ORAL
OUTPATIENT
Start: 2025-02-06

## 2025-01-09 RX ORDER — HYDROCORTISONE SODIUM SUCCINATE 100 MG/2ML
100 INJECTION INTRAMUSCULAR; INTRAVENOUS
OUTPATIENT
Start: 2025-02-06

## 2025-01-09 RX ORDER — EPINEPHRINE 1 MG/ML
0.3 INJECTION, SOLUTION INTRAMUSCULAR; SUBCUTANEOUS PRN
OUTPATIENT
Start: 2025-02-06

## 2025-01-09 RX ORDER — DIPHENHYDRAMINE HYDROCHLORIDE 50 MG/ML
50 INJECTION INTRAMUSCULAR; INTRAVENOUS
OUTPATIENT
Start: 2025-02-06

## 2025-01-09 RX ADMIN — WATER 100 MG: 1 INJECTION INTRAMUSCULAR; INTRAVENOUS; SUBCUTANEOUS at 14:29

## 2025-01-09 ASSESSMENT — PAIN SCALES - GENERAL: PAINLEVEL_OUTOF10: 5

## 2025-01-09 ASSESSMENT — PAIN DESCRIPTION - DESCRIPTORS: DESCRIPTORS: ACHING

## 2025-01-09 ASSESSMENT — PAIN DESCRIPTION - ORIENTATION: ORIENTATION: LEFT

## 2025-01-09 ASSESSMENT — PAIN DESCRIPTION - LOCATION: LOCATION: KNEE

## 2025-01-09 NOTE — PROGRESS NOTES
Name: Katie Jordan    MRN: [id number]         : 1945    Nucala       Ms. Jordan arrived to [facility] at 1408 via wheelchair. Ambulated to chair without assist.     Ms. Jordan was assessed and education was provided. She denied any complications from her previous Nucala injection.     Ms. Jordan's vitals were reviewed.  Vitals:    25 1427   BP: 126/65   Pulse: 72   Resp: 18   Temp: 97.2 °F (36.2 °C)   SpO2: 96%     Nucala 100 mg was administered as ordered SQ in patient's LLQ abdomen. Band aid applied.      Ms. Jordan tolerated the injection well, and she had no complaints.     She remained for 10 minutes for observation post injection. She had no complaints of any allergic reaction.      Pt's armband removed & shredded.    [facility] condition at 1440.  Pending updated new Nucala order/office will call to schedule her next appointment.     Felisha Campos RN  2025     Ambulatory

## 2025-01-15 ENCOUNTER — HOSPITAL ENCOUNTER (OUTPATIENT)
Facility: HOSPITAL | Age: 80
Setting detail: RECURRING SERIES
Discharge: HOME | End: 2025-01-18
Payer: MEDICARE

## 2025-01-15 PROCEDURE — G0237 THERAPEUTIC PROCD STRG ENDUR: HCPCS

## 2025-01-16 VITALS — OXYGEN SATURATION: 92 %

## 2025-01-16 ASSESSMENT — PULMONARY FUNCTION TESTS
FVC (LITERS): 83
FEV1/FVC: 80
FEV1 (%PREDICTED): 91

## 2025-01-16 ASSESSMENT — LIFESTYLE VARIABLES: ALCOHOL_USE: YES

## 2025-01-16 ASSESSMENT — EXERCISE STRESS TEST
PEAK_METS: 1.7
PEAK_RPE: 4
PEAK_BP: 151/86
PEAK_HR: 493

## 2025-01-16 ASSESSMENT — EJECTION FRACTION: EF_VALUE: 75

## 2025-01-16 NOTE — PROGRESS NOTES
Pulmonary/RT Note    Visit #               Katie Jordan is a 79 y.o. female presented today for pulmonary rehab.  states that there have been no changes in medication or health since the last visit.          She has a target heart rate of 104-119. She tolerated the exercise session well and has a pain level of 4. Patient states RPE for session today was 4 and RPD was a 3. Today the pt did the following.      Arm bike:15  min  Wallkin min  Breathing retraining:15 min  Recovery and Monitorin min             Physician available for emergencies: Travis Celaya         Periodic Check-in: 01/15/2025    What do you feel in going well for you in CardioPulmonary Rehab: My injured knee prevented my participating fully at the beginning, so better now that I can walk on the treadmill and work on building strength      What areas are you still seeking improvement: need to build strength and breathing to counter shortness of breath    Do you have any additional goals you would like to work on:  Need to be able to stand for 10 minutes      Do you currently exercise outside of Pulmonary Rehab?  Type: chair exercises  Frequency:  some  Duration:     Is there any educational material that I can provide for you today: Chair exercises that help increase stamina            
  Stages of Change Other (comment)  [quit 30+ years ago]   Primary Tobacco Type --   Tobacco Use Status --   Quit --   Date Started --   Date Quit --   Quit Date Set --   # Cigarette Smoked/Day --   Smokeless Tobacco Use --   Amount --   Retired, Read Only Tobacco Use --   Tobacco Cessation Intervention    Smoking Cessation Referral --   Tobacco Adjunct Therapy --   Tobacco Cessation Education    Resource Information Provided --   Tobacco Triggers --   Tobacco Cessation Goals    Patient Target Goals --   Patient Treatment Goal --   Goal Status --   Progress Towards Goal --   Retired, Read Only Patient Stated Tobacco Goals --   Nutrition Assessment    Stages of Change Action   Nutrition Assessment Tool --   Retired, Read Only Nutrition Assessment Tool --   Rate Your Plate Total Score --   Current Diet --   Barriers to Heart Healthy Diet --   Alcohol Yes   Type Wine   Retired, Read Only Type --   Amount (Drinks Per Week) 0   Height 1.676 m (5' 6\")   Weight 115.2 kg (254 lb)   BMI 41.08   Waist Circumference (In) --   % Body Fat --   Lipid Management Assessment    Stages of Change Action   Date Lipids Drawn --   Total --   HDL --   LDL --   Triglycerides --   Weight Management Assessment    Stages of Change --   Height 1.676 m (5' 6\")   Weight 115.2 kg (254 lb)   BMI 41.08   Waist Circumference (In) --   % Body Fat Loss/Gain --   Current Weight Loss/Gain Strategies --   Barriers to Weight Loss/Gain --   Nutrition Intervention    Dietitian Consult No   Staff/Patient Discussion Yes   Supplemental Nutrition --   Diabetes Education Referral No   Lipid Clinic Referral No   Nutrition Education    Education Low fat saturated diet, Low sodium diet, Limit added sugars, Overall healthy eating pattern that emphasizes vegetables, fruits, whole grains, healthy proteins and non-tropical oils, Reduced calorie/portion controlled diet   Comments --   Nutrition Goals    Patient Target Goals LDL-C less than 70 and non-HDL-C <100 for

## 2025-01-17 ENCOUNTER — APPOINTMENT (OUTPATIENT)
Facility: HOSPITAL | Age: 80
End: 2025-01-17
Payer: MEDICARE

## 2025-01-20 ENCOUNTER — APPOINTMENT (OUTPATIENT)
Facility: HOSPITAL | Age: 80
End: 2025-01-20
Payer: MEDICARE

## 2025-01-22 ENCOUNTER — HOSPITAL ENCOUNTER (OUTPATIENT)
Facility: HOSPITAL | Age: 80
Setting detail: RECURRING SERIES
End: 2025-01-22
Payer: MEDICARE

## 2025-01-27 ENCOUNTER — HOSPITAL ENCOUNTER (OUTPATIENT)
Facility: HOSPITAL | Age: 80
Setting detail: RECURRING SERIES
Discharge: HOME | End: 2025-01-30
Payer: MEDICARE

## 2025-01-27 PROCEDURE — G0237 THERAPEUTIC PROCD STRG ENDUR: HCPCS

## 2025-01-27 NOTE — PROGRESS NOTES
Pulmonary/RT Note    Visit #        9/36       Katie Jordan is a 80 y.o. female presented today for pulmonary rehab. She states that there have been no changes in medication or health since the last visit.          She has a target heart rate of 104-119. She tolerated the exercise session well and has a pain level of 3. Patient states RPE for session today was 4 and RPD was a 3. Today the pt did the following.    Bike:5 min  Arm bike: 15 min  Wallking: 10 min  Breathing retraining:15 min  Recovery and Monitoring:15 min             Physician available for emergencies:     JAMILAH SHORT, RT 1/27/2025 3:15 PM      
35

## 2025-01-29 ENCOUNTER — HOSPITAL ENCOUNTER (OUTPATIENT)
Facility: HOSPITAL | Age: 80
Setting detail: RECURRING SERIES
Discharge: HOME | End: 2025-02-01
Payer: MEDICARE

## 2025-01-29 PROCEDURE — G0239 OTH RESP PROC, GROUP: HCPCS

## 2025-01-29 PROCEDURE — G0237 THERAPEUTIC PROCD STRG ENDUR: HCPCS

## 2025-01-29 NOTE — PROGRESS NOTES
Pulmonary/RT Note    Visit #        10/36       Katie Jordan is a 80 y.o. female presented today for pulmonary rehab. She states that there have been no changes in medication or health since the last visit.          She has a target heart rate of 104-119. She tolerated the exercise session well and has a pain level of 5. Patient states RPE for session today was 4 and RPD was a 3. Today the pt did the following.    Arm bike: 15 min  Wallking: 10 min  Breathing retraining:15 min  Recovery and Monitoring:15 min             Physician available for emergencies: JAMILAH SHORT, RT 1/29/2025 2:59 PM

## 2025-02-03 ENCOUNTER — HOSPITAL ENCOUNTER (OUTPATIENT)
Facility: HOSPITAL | Age: 80
Setting detail: RECURRING SERIES
Discharge: HOME | End: 2025-02-06
Payer: MEDICARE

## 2025-02-03 PROCEDURE — G0237 THERAPEUTIC PROCD STRG ENDUR: HCPCS

## 2025-02-03 NOTE — PROGRESS NOTES
Pulmonary/RT Note    Visit #        11/36       Katie Jordan is a 80 y.o. female presented today for pulmonary rehab. She states that there have been no changes in medication or health since the last visit.          She has a target heart rate of 104-119. She tolerated the exercise session well and has a pain level of 5. Patient states RPE for session today was 4 and RPD was a 3. Today the pt did the following.    Arm bike: 15 min  Wallking: 10 min  Breathing retraining:15 min  Recovery and Monitoring:15 min             Physician available for emergencies: BIJU SHORT, RT 2/3/2025 3:10 PM

## 2025-02-05 ENCOUNTER — HOSPITAL ENCOUNTER (OUTPATIENT)
Facility: HOSPITAL | Age: 80
Setting detail: RECURRING SERIES
Discharge: HOME | End: 2025-02-08
Payer: MEDICARE

## 2025-02-05 PROCEDURE — G0239 OTH RESP PROC, GROUP: HCPCS

## 2025-02-05 PROCEDURE — G0237 THERAPEUTIC PROCD STRG ENDUR: HCPCS

## 2025-02-10 ENCOUNTER — HOSPITAL ENCOUNTER (OUTPATIENT)
Facility: HOSPITAL | Age: 80
Setting detail: RECURRING SERIES
Discharge: HOME | End: 2025-02-13
Payer: MEDICARE

## 2025-02-10 ENCOUNTER — HOSPITAL ENCOUNTER (OUTPATIENT)
Facility: HOSPITAL | Age: 80
Setting detail: INFUSION SERIES
Discharge: HOME | End: 2025-02-10
Payer: MEDICARE

## 2025-02-10 VITALS
RESPIRATION RATE: 18 BRPM | OXYGEN SATURATION: 95 % | HEART RATE: 76 BPM | TEMPERATURE: 97.2 F | DIASTOLIC BLOOD PRESSURE: 62 MMHG | SYSTOLIC BLOOD PRESSURE: 128 MMHG

## 2025-02-10 DIAGNOSIS — J45.50 SEVERE PERSISTENT ASTHMA, UNSPECIFIED WHETHER COMPLICATED (HCC): Primary | ICD-10-CM

## 2025-02-10 PROCEDURE — G0237 THERAPEUTIC PROCD STRG ENDUR: HCPCS

## 2025-02-10 PROCEDURE — 96372 THER/PROPH/DIAG INJ SC/IM: CPT

## 2025-02-10 PROCEDURE — 2500000003 HC RX 250 WO HCPCS: Performed by: INTERNAL MEDICINE

## 2025-02-10 PROCEDURE — 6360000002 HC RX W HCPCS: Performed by: INTERNAL MEDICINE

## 2025-02-10 PROCEDURE — G0239 OTH RESP PROC, GROUP: HCPCS

## 2025-02-10 RX ORDER — ONDANSETRON 2 MG/ML
8 INJECTION INTRAMUSCULAR; INTRAVENOUS
OUTPATIENT
Start: 2025-03-03

## 2025-02-10 RX ORDER — EPINEPHRINE 1 MG/ML
0.3 INJECTION, SOLUTION INTRAMUSCULAR; SUBCUTANEOUS PRN
OUTPATIENT
Start: 2025-03-03

## 2025-02-10 RX ORDER — ACETAMINOPHEN 325 MG/1
650 TABLET ORAL
OUTPATIENT
Start: 2025-03-03

## 2025-02-10 RX ORDER — DIPHENHYDRAMINE HYDROCHLORIDE 50 MG/ML
50 INJECTION INTRAMUSCULAR; INTRAVENOUS
OUTPATIENT
Start: 2025-03-03

## 2025-02-10 RX ORDER — SODIUM CHLORIDE 9 MG/ML
INJECTION, SOLUTION INTRAVENOUS CONTINUOUS
OUTPATIENT
Start: 2025-03-03

## 2025-02-10 RX ORDER — HYDROCORTISONE SODIUM SUCCINATE 100 MG/2ML
100 INJECTION INTRAMUSCULAR; INTRAVENOUS
OUTPATIENT
Start: 2025-03-03

## 2025-02-10 RX ORDER — ALBUTEROL SULFATE 90 UG/1
4 INHALANT RESPIRATORY (INHALATION) PRN
OUTPATIENT
Start: 2025-03-03

## 2025-02-10 RX ADMIN — WATER 100 MG: 1 INJECTION INTRAMUSCULAR; INTRAVENOUS; SUBCUTANEOUS at 14:28

## 2025-02-10 ASSESSMENT — PAIN DESCRIPTION - DESCRIPTORS: DESCRIPTORS: ACHING

## 2025-02-10 ASSESSMENT — PAIN DESCRIPTION - LOCATION: LOCATION: KNEE

## 2025-02-10 ASSESSMENT — PAIN DESCRIPTION - ORIENTATION: ORIENTATION: LEFT

## 2025-02-10 ASSESSMENT — PAIN SCALES - GENERAL: PAINLEVEL_OUTOF10: 5

## 2025-02-10 NOTE — PROGRESS NOTES
Name: Katie Jordan    MRN: [id number]         : 1945    Nucala      Ms. Jordan arrived to [facility] via wheelchair at 1410. She denied any complications from her previous Nucala injection.     Ms. Jordan was assessed and education was provided.     Ms. Jordan's vitals were reviewed.  Vitals:    02/10/25 1415   BP: 128/62   Pulse: 76   Resp: 18   Temp: 97.2 °F (36.2 °C)   SpO2: 95%       Nucala 100 mg was administered as ordered SQ in patient's LLQ abdomen. Band aid applied.      Ms. Jordan tolerated the injection well, and she had no complaints.     She remained for 15 minutes for observation post injection. She had no complaints of any allergic reaction or any other complaints.      Pt's armband removed & shredded.    [facility] condition at 1450.  She is to return on  at 25 at 1430 for her next Nucala appointment.    Felisha Campos RN  February 10, 2025

## 2025-02-10 NOTE — PROGRESS NOTES
Visit #        /         Katie Jordan is a 80 y.o. female presented today for pulmonary rehab. She states that there have been no changes in medication or health since the last visit.          She has a target heart rate of 104-119. She tolerated the exercise session well and has a pain level of 5. Patient states RPE for session today was 5 and RPD was a 7. Today the pt did the following.     Arm bike: 25 min  Wallking:pt walked from appt in hospital  Breathing retraining:15 min  Recovery and Monitorin min             Physician available for emergencies: Travis Celaya

## 2025-02-12 VITALS — OXYGEN SATURATION: 95 %

## 2025-02-12 ASSESSMENT — EXERCISE STRESS TEST
PEAK_BP: 151/86
PEAK_HR: 493
PEAK_RPE: 4
PEAK_METS: 1.7

## 2025-02-12 ASSESSMENT — PULMONARY FUNCTION TESTS
FEV1/FVC: 80
FVC (LITERS): 83
FEV1 (%PREDICTED): 91

## 2025-02-12 ASSESSMENT — EJECTION FRACTION: EF_VALUE: 75

## 2025-02-12 ASSESSMENT — LIFESTYLE VARIABLES: ALCOHOL_USE: YES

## 2025-02-12 NOTE — PROGRESS NOTES
Katie Jordan #[id number] (CSN:[id number]) (80 y.o. F) (Adm: 02/10/25)  [facility]  Pulmonary ITP    Flowsheet Row CARDIO PULMONARY REHAB from 2/10/2025 in [facility] PULMONARY REHAB  Most recent reading at 2/12/2025  3:00 PM   Treatment Diagnosis    Treatment Diagnosis 1 Asthma   Treatment Diagnosis 2 --   Referral Date 11/01/24   Significant Cardiovascular History Previous myocardial infarction, Previous PCI   Co-morbidities Auto-immune disease, Previous MI, Pulmonary disease   Oxygen Saturation / Titration    Stages of Change Action   Oxygen Assessment    Stages of Change Action   Oxygen Type --   Oxygen Compliant --   O2 Flow Rate (l/min) - Rest --   O2 Flow Rate (l/min) - Exercise --   O2 Flow Rate (l/min) - Sleep --   Breath Sounds --   O2 Sat Greater Than 90% --   Retired, Read Only Patients Liter Flow --   Individual Treatment Plan    ITP Visit Type Re-assessment   1st Date of Exercise 11/25/24   ITP Next Review Date 03/13/25   Visit #/Total Visits 13/36   EF% 75 %   FVC (Liters) 83 liters   FEV1 (%PRED) 91   FEV1/FVC 80   DLCO (mL/mmHg sec) --   Risk Stratification Moderate   Pulmonary ITP Exercise, Psychosocial, Nutrition, Education   Exercise    Ribeiro Total Score --   Stages of Change Action   Exercise Test Six minute walk test   Distance Calculated in ft   Distance (Feet-Actual) --   Distance (miles) --   Distance (Feet-Calculated) --   Distance (meters) --   Peak    Peak /86   Peak RPE 4   Peak Mets 1.7   SpO2 91-97   O2 Flow Rate (l/min) --   Stops 1   SPO2 Range 91-97   DASI Total Score --   BMI (Calculated) --   FEV1 (%PRED) 91   FANNIE Total Score --   Exercise Prescription    Mode Track, Bike, Stepper, Ergometer, Resistance training   Frequency per week 2   Duration Per Session 60   Intensity METS       1.7   Progression slow   SpO2 95 %   O2 Device Room air   O2 Flow Rate (l/min) --   Retired, Read Only FIO2 (%) --   O2 Temperature --   Comments --   Symptoms with Exercise Shortness of breath,

## 2025-02-14 ENCOUNTER — APPOINTMENT (OUTPATIENT)
Facility: HOSPITAL | Age: 80
End: 2025-02-14
Payer: MEDICARE

## 2025-02-17 ENCOUNTER — HOSPITAL ENCOUNTER (OUTPATIENT)
Facility: HOSPITAL | Age: 80
Setting detail: RECURRING SERIES
Discharge: HOME | End: 2025-02-20
Payer: MEDICARE

## 2025-02-17 PROCEDURE — G0237 THERAPEUTIC PROCD STRG ENDUR: HCPCS

## 2025-02-17 NOTE — PROGRESS NOTES
Pulmonary/RT Note    Visit #        14/36       Katie Jordan is a 80 y.o. female presented today for pulmonary rehab. She states that there have been no changes in medication or health since the last visit.          She has a target heart rate of 104-119. She tolerated the exercise session well and has a pain level of 5. Patient states RPE for session today was 4 and RPD was a 3. Today the pt did the following.    Arm bike: 15 min  Wallking: 15 min  Breathing retraining:15 min  Recovery and Monitoring:15 min             Physician available for emergencies: BIJU SHORT, RT 2/17/2025 2:48 PM

## 2025-02-19 ENCOUNTER — APPOINTMENT (OUTPATIENT)
Facility: HOSPITAL | Age: 80
End: 2025-02-19
Payer: MEDICARE

## 2025-02-24 ENCOUNTER — HOSPITAL ENCOUNTER (OUTPATIENT)
Facility: HOSPITAL | Age: 80
Setting detail: RECURRING SERIES
Discharge: HOME | End: 2025-02-27
Payer: MEDICARE

## 2025-02-24 PROCEDURE — G0237 THERAPEUTIC PROCD STRG ENDUR: HCPCS

## 2025-02-24 PROCEDURE — G0239 OTH RESP PROC, GROUP: HCPCS

## 2025-02-24 NOTE — PROGRESS NOTES
Pulmonary/RT Note    Visit #        15/36       Katie Jordan is a 80 y.o. female presented today for pulmonary rehab. She states that there have been no changes in medication or health since the last visit.          She has a target heart rate of 109-124. She tolerated the exercise session well and has a pain level of 6. Patient states RPE for session today was 4 and RPD was a 3. Today the pt did the following.    Arm bike: 15 min  Wallking: 15 min  Breathing retraining:15 min  Recovery and Monitoring:15 min             Physician available for emergencies:     BIJU SHORT, RT 2/24/2025 3:40 PM

## 2025-02-28 ENCOUNTER — HOSPITAL ENCOUNTER (OUTPATIENT)
Facility: HOSPITAL | Age: 80
Setting detail: RECURRING SERIES
End: 2025-02-28
Payer: MEDICARE

## 2025-02-28 PROCEDURE — G0237 THERAPEUTIC PROCD STRG ENDUR: HCPCS

## 2025-02-28 NOTE — PROGRESS NOTES
Pulmonary/RT Note    Visit #               Katie Jordan is a 80 y.o. female presented today for pulmonary rehab. She states that there have been no changes in medication or health since the last visit.          She has a target heart rate of 104-119. She tolerated the exercise session well and has a pain level of 6/10. Patient states RPE for session today was 5 and RPD was a 3.5. Today the pt did the following.        Nustep: min  Bike: min  Arm bike:  10 min  Wallking:  15 min  Resistance bands/Weights:  min   Breathing retraining:15 min  Bodyweight:  min  Recovery and Monitorin min             Physician available for emergencies: Dr. Yomi Belcher 2025 3:18 PM

## 2025-03-03 ENCOUNTER — HOSPITAL ENCOUNTER (OUTPATIENT)
Facility: HOSPITAL | Age: 80
Setting detail: RECURRING SERIES
Discharge: HOME | End: 2025-03-06
Payer: MEDICARE

## 2025-03-03 PROCEDURE — G0237 THERAPEUTIC PROCD STRG ENDUR: HCPCS

## 2025-03-03 ASSESSMENT — PATIENT HEALTH QUESTIONNAIRE - PHQ9
6. FEELING BAD ABOUT YOURSELF - OR THAT YOU ARE A FAILURE OR HAVE LET YOURSELF OR YOUR FAMILY DOWN: NOT AT ALL
3. TROUBLE FALLING OR STAYING ASLEEP: NOT AT ALL
1. LITTLE INTEREST OR PLEASURE IN DOING THINGS: SEVERAL DAYS
SUM OF ALL RESPONSES TO PHQ QUESTIONS 1-9: 3
9. THOUGHTS THAT YOU WOULD BE BETTER OFF DEAD, OR OF HURTING YOURSELF: NOT AT ALL
SUM OF ALL RESPONSES TO PHQ QUESTIONS 1-9: 3
4. FEELING TIRED OR HAVING LITTLE ENERGY: SEVERAL DAYS
5. POOR APPETITE OR OVEREATING: NOT AT ALL
SUM OF ALL RESPONSES TO PHQ QUESTIONS 1-9: 3
10. IF YOU CHECKED OFF ANY PROBLEMS, HOW DIFFICULT HAVE THESE PROBLEMS MADE IT FOR YOU TO DO YOUR WORK, TAKE CARE OF THINGS AT HOME, OR GET ALONG WITH OTHER PEOPLE: NOT DIFFICULT AT ALL
8. MOVING OR SPEAKING SO SLOWLY THAT OTHER PEOPLE COULD HAVE NOTICED. OR THE OPPOSITE, BEING SO FIGETY OR RESTLESS THAT YOU HAVE BEEN MOVING AROUND A LOT MORE THAN USUAL: NOT AT ALL
7. TROUBLE CONCENTRATING ON THINGS, SUCH AS READING THE NEWSPAPER OR WATCHING TELEVISION: NOT AT ALL
SUM OF ALL RESPONSES TO PHQ QUESTIONS 1-9: 3
2. FEELING DOWN, DEPRESSED OR HOPELESS: SEVERAL DAYS

## 2025-03-03 NOTE — PROGRESS NOTES
Pulmonary/RT Note    Visit #               Katie Jordan is a 80 y.o. female presented today for pulmonary rehab. She states that there have been no changes in medication or health since the last visit.          She has a target heart rate of 104-119. She tolerated the exercise session well and has a pain level of 4. Patient states RPE for session today was and RPD was a 3. Today the pt did the following.    Arm bike:21 min  Wallkin min  Resistance bands/Weights:  min   Breathing retraining:15 min  Bodyweight:  min  Recovery and Monitoring:15 min             Physician available for emergencies:     Mykel SHORT, RT 3/3/2025 2:55 PM        Periodic Check-in:     What do you feel in going well for you in CardioPulmonary Rehab:    Increasing mobility and endurance    What areas are you still seeking improvement: more of the same-ability to walk w/out getting short of breath    Do you have any additional goals you would like to work on:       Do you currently exercise outside of Pulmonary Rehab? Yes-some  Type:  chair exercise & lower back exerciser   Frequency:  daily  Duration: 10  each    Is there any educational material that I can provide for you today:  anything on stretching

## 2025-03-07 ENCOUNTER — HOSPITAL ENCOUNTER (OUTPATIENT)
Facility: HOSPITAL | Age: 80
Setting detail: RECURRING SERIES
Discharge: HOME | End: 2025-03-10
Payer: MEDICARE

## 2025-03-07 PROCEDURE — G0239 OTH RESP PROC, GROUP: HCPCS

## 2025-03-07 PROCEDURE — G0237 THERAPEUTIC PROCD STRG ENDUR: HCPCS

## 2025-03-07 NOTE — PROGRESS NOTES
Pulmonary/RT Note    Visit #               Katie Jordan is a 80 y.o. female presented today for pulmonary rehab. She states that there have been no changes in medication or health since the last visit.          She has a target heart rate of 104-119. She tolerated the exercise session well and has a pain level of 5. Patient states RPE for session today was 4 and RPD was a 3. Today the pt did the following.    Arm bike:15  min  Wallkin min  Breathing retraining:15 min  Recovery and Monitoring:15 min             Physician available for emergencies: Mykel SHORT, RT 3/7/2025 3:36 PM

## 2025-03-10 ENCOUNTER — HOSPITAL ENCOUNTER (OUTPATIENT)
Facility: HOSPITAL | Age: 80
Setting detail: RECURRING SERIES
Discharge: HOME | End: 2025-03-13
Payer: MEDICARE

## 2025-03-10 ENCOUNTER — HOSPITAL ENCOUNTER (OUTPATIENT)
Facility: HOSPITAL | Age: 80
Setting detail: INFUSION SERIES
Discharge: HOME | End: 2025-03-10
Payer: MEDICARE

## 2025-03-10 VITALS
DIASTOLIC BLOOD PRESSURE: 74 MMHG | OXYGEN SATURATION: 96 % | RESPIRATION RATE: 18 BRPM | SYSTOLIC BLOOD PRESSURE: 138 MMHG | HEART RATE: 68 BPM | TEMPERATURE: 97.4 F

## 2025-03-10 DIAGNOSIS — J45.50 SEVERE PERSISTENT ASTHMA, UNSPECIFIED WHETHER COMPLICATED (HCC): Primary | ICD-10-CM

## 2025-03-10 PROCEDURE — G0237 THERAPEUTIC PROCD STRG ENDUR: HCPCS

## 2025-03-10 PROCEDURE — G0239 OTH RESP PROC, GROUP: HCPCS

## 2025-03-10 PROCEDURE — 96372 THER/PROPH/DIAG INJ SC/IM: CPT

## 2025-03-10 PROCEDURE — 2500000003 HC RX 250 WO HCPCS: Performed by: INTERNAL MEDICINE

## 2025-03-10 PROCEDURE — 6360000002 HC RX W HCPCS: Performed by: INTERNAL MEDICINE

## 2025-03-10 RX ORDER — HYDROCORTISONE SODIUM SUCCINATE 100 MG/2ML
100 INJECTION INTRAMUSCULAR; INTRAVENOUS
OUTPATIENT
Start: 2025-04-07

## 2025-03-10 RX ORDER — ONDANSETRON 2 MG/ML
8 INJECTION INTRAMUSCULAR; INTRAVENOUS
OUTPATIENT
Start: 2025-04-07

## 2025-03-10 RX ORDER — SODIUM CHLORIDE 9 MG/ML
INJECTION, SOLUTION INTRAVENOUS CONTINUOUS
OUTPATIENT
Start: 2025-04-07

## 2025-03-10 RX ORDER — ALBUTEROL SULFATE 90 UG/1
4 INHALANT RESPIRATORY (INHALATION) PRN
OUTPATIENT
Start: 2025-04-07

## 2025-03-10 RX ORDER — EPINEPHRINE 1 MG/ML
0.3 INJECTION, SOLUTION INTRAMUSCULAR; SUBCUTANEOUS PRN
OUTPATIENT
Start: 2025-04-07

## 2025-03-10 RX ORDER — DIPHENHYDRAMINE HYDROCHLORIDE 50 MG/ML
50 INJECTION, SOLUTION INTRAMUSCULAR; INTRAVENOUS
OUTPATIENT
Start: 2025-04-07

## 2025-03-10 RX ORDER — ACETAMINOPHEN 325 MG/1
650 TABLET ORAL
OUTPATIENT
Start: 2025-04-07

## 2025-03-10 RX ADMIN — WATER 100 MG: 1 INJECTION INTRAMUSCULAR; INTRAVENOUS; SUBCUTANEOUS at 14:18

## 2025-03-10 ASSESSMENT — PAIN SCALES - GENERAL: PAINLEVEL_OUTOF10: 4

## 2025-03-10 ASSESSMENT — PAIN DESCRIPTION - LOCATION: LOCATION: KNEE

## 2025-03-10 ASSESSMENT — PAIN DESCRIPTION - DESCRIPTORS: DESCRIPTORS: BURNING

## 2025-03-10 ASSESSMENT — PAIN DESCRIPTION - ORIENTATION: ORIENTATION: LEFT

## 2025-03-10 NOTE — PROGRESS NOTES
Name: Katie Jordan    MRN: [id number]         : 1945    Nucala       Ms. Jordan arrived to [facility] at 1400 via wheelchair. She denied any complications from her previous Nucala injection.     Ms. Jordan was assessed and education was provided.     Ms. Jordan's vitals were reviewed.  Vitals:    03/10/25 1415   BP: 138/74   Pulse: 68   Resp: 18   Temp: 97.4 °F (36.3 °C)   SpO2: 96%     Nucala 100 mg was administered as ordered SQ in patient's RLQ abdomen. Band aid applied.      Ms. Jordan tolerated the injection well, and she had no complaints.     Patient remained for 30 minutes for observation per her request post injection. She had no complaints of any allergic reaction or any other complaints.      Pt's armband removed & shredded.    [facility] condition at 1448.  She is to return on 25 at 1400 for her next Nucala appointment.    Felisha Campos RN  March 10, 2025

## 2025-03-10 NOTE — PROGRESS NOTES
Mildred stallworth, RT  Respiratory Therapist  Pulmonary Rehab     Progress Notes     Signed     Date of Service: 3/10/2025 12:30 PM     Signed         Pulmonary/RT Note     Visit #                 Katie Jordan is a 80 y.o. female presented today for pulmonary rehab. She states that there have been no changes in medication or health since the last visit.          She has a target heart rate of 104-119. She tolerated the exercise session well and has a pain level of 4. Patient states RPE for session today was 4 and RPD was a 3. Today the pt did the following.     Arm bike: 15 min  Wallkin min  Breathing retraining:15 min  Recovery and Monitoring:15 min             Physician available for emergencies: RT Danis 3/10/2025 1:02 PM                     p

## 2025-03-10 NOTE — PROGRESS NOTES
Pulmonary/RT Note    Visit #               Katie Joradn is a 80 y.o. female presented today for pulmonary rehab. She states that there have been no changes in medication or health since the last visit.          She has a target heart rate of 104-119. She tolerated the exercise session well and has a pain level of 4. Patient states RPE for session today was 4 and RPD was a 3. Today the pt did the following.    Arm bike: 15 min  Wallkin min  Breathing retraining:15 min  Recovery and Monitoring:15 min             Physician available for emergencies: Travis SHORT, RT 3/10/2025 1:02 PM

## 2025-03-12 ENCOUNTER — APPOINTMENT (OUTPATIENT)
Facility: HOSPITAL | Age: 80
End: 2025-03-12
Payer: MEDICARE

## 2025-03-13 ENCOUNTER — HOSPITAL ENCOUNTER (OUTPATIENT)
Facility: HOSPITAL | Age: 80
Setting detail: RECURRING SERIES
Discharge: HOME | End: 2025-03-16
Payer: MEDICARE

## 2025-03-13 VITALS — OXYGEN SATURATION: 96 %

## 2025-03-13 PROCEDURE — G0239 OTH RESP PROC, GROUP: HCPCS

## 2025-03-13 PROCEDURE — G0237 THERAPEUTIC PROCD STRG ENDUR: HCPCS

## 2025-03-13 ASSESSMENT — EXERCISE STRESS TEST
PEAK_RPE: 4
PEAK_HR: 493
PEAK_BP: 151/86
PEAK_METS: 1.7

## 2025-03-13 ASSESSMENT — LIFESTYLE VARIABLES: ALCOHOL_USE: YES

## 2025-03-13 ASSESSMENT — PULMONARY FUNCTION TESTS
FEV1/FVC: 80
FVC (LITERS): 83
FEV1 (%PREDICTED): 91

## 2025-03-13 ASSESSMENT — EJECTION FRACTION: EF_VALUE: 75

## 2025-03-13 NOTE — PROGRESS NOTES
Katie Jordan #[id number] (CSN:[id number]) (80 y.o. F) (Adm: 03/10/25)  [facility]  Cardiac ITP    Flowsheet Row CARDIO PULMONARY REHAB from 3/10/2025 in [facility] PULMONARY REHAB  Most recent reading at 3/13/2025  1:39 PM   Treatment Diagnosis    Treatment Diagnosis 1 Asthma   Treatment Diagnosis 2 --   Referral Date 11/01/24   Significant Cardiovascular History Previous myocardial infarction, Previous PCI   Co-morbidities Auto-immune disease, Previous MI, Pulmonary disease   Oxygen Saturation / Titration    Stages of Change Action   Oxygen Assessment    Stages of Change Action   Oxygen Type --   Oxygen Compliant --   O2 Flow Rate (l/min) - Rest --   O2 Flow Rate (l/min) - Exercise --   O2 Flow Rate (l/min) - Sleep --   Breath Sounds --   O2 Sat Greater Than 90% --   Retired, Read Only Patients Liter Flow --   Individual Treatment Plan    ITP Visit Type Re-assessment   1st Date of Exercise 11/25/24   ITP Next Review Date 03/13/25   Visit #/Total Visits 19/36   EF% 75 %   FVC (Liters) 83 liters   FEV1 (%PRED) 91   FEV1/FVC 80   DLCO (mL/mmHg sec) --   Risk Stratification Moderate   Pulmonary ITP Exercise, Psychosocial, Nutrition, Education   Exercise    Ribeiro Total Score --   Stages of Change Action   Exercise Test Six minute walk test   Distance Calculated in ft   Distance (Feet-Actual) --   Distance (miles) --   Distance (Feet-Calculated) --   Distance (meters) --   Peak    Peak /86   Peak RPE 4   Peak Mets 1.7   SpO2 --   O2 Flow Rate (l/min) --   Stops 1   SPO2 Range 91-97   DASI Total Score --   BMI (Calculated) --   FEV1 (%PRED) 91   FANNIE Total Score --   Exercise Prescription    Mode Track, Bike, Stepper, Ergometer, Resistance training   Frequency per week 2   Duration Per Session 60   Intensity METS       1.7   Progression slow   SpO2 96 %   O2 Device Room air   O2 Flow Rate (l/min) --   Retired, Read Only FIO2 (%) --   O2 Temperature --   Comments --   Symptoms with Exercise Shortness of breath,

## 2025-03-13 NOTE — PROGRESS NOTES
Pulmonary/RT Note    Visit #               Katie Jordan is a 80 y.o. female presented today for pulmonary rehab. She states that there have been no changes in medication or health since the last visit.          She has a target heart rate of 104-119. She tolerated the exercise session well and has a pain level of 6. Patient states RPE for session today was 4 and RPD was a 3. Today the pt did the following.    Arm bike: 15 min  [place] min  Breathing retraining:15 min  Recovery and Monitoring:15 min             Physician available for emergencies: Travis SHORT, RT 3/13/2025 2:04 PM

## 2025-03-18 ENCOUNTER — APPOINTMENT (OUTPATIENT)
Facility: HOSPITAL | Age: 80
End: 2025-03-18
Payer: MEDICARE

## 2025-03-21 ENCOUNTER — HOSPITAL ENCOUNTER (OUTPATIENT)
Facility: HOSPITAL | Age: 80
Setting detail: RECURRING SERIES
Discharge: HOME | End: 2025-03-24
Payer: MEDICARE

## 2025-03-21 PROCEDURE — G0237 THERAPEUTIC PROCD STRG ENDUR: HCPCS

## 2025-03-21 NOTE — PROGRESS NOTES
Pulmonary/RT Note    Visit #        21/36       Katie Jordan is a 80 y.o. female presented today for pulmonary rehab. She states that there have been no changes in medication or health since the last visit.          She has a target heart rate of 104-119. She tolerated the exercise session well and has a pain level of 5. Patient states RPE for session today was 4 and RPD was a 3. Today the pt did the following.    Arm bike: 15 min  Wallking: 15 min  Breathing retraining:15 min  Recovery and Monitoring:15 min             Physician available for emergencies: BIJU SHORT, RT 3/21/2025 2:41 PM

## 2025-03-24 ENCOUNTER — HOSPITAL ENCOUNTER (OUTPATIENT)
Facility: HOSPITAL | Age: 80
Setting detail: RECURRING SERIES
Discharge: HOME | End: 2025-03-27
Payer: MEDICARE

## 2025-03-24 PROCEDURE — G0237 THERAPEUTIC PROCD STRG ENDUR: HCPCS

## 2025-03-24 NOTE — PROGRESS NOTES
Pulmonary/RT Note    Visit #        22/36       Katie Jordan is a 80 y.o. female presented today for pulmonary rehab. She states that there have been no changes in medication or health since the last visit.          She has a target heart rate of 104-119. She tolerated the exercise session well and has a pain level of 5. Patient states RPE for session today was 4 and RPD was a 3. Today the pt did the following.    Arm bike: 15 min  Wallking: 15 min  Breathing retraining:15 min  Recovery and Monitoring:15 min             Physician available for emergencies: JAMILAH SHORT, RT 3/24/2025 2:45 PM

## 2025-03-27 ENCOUNTER — HOSPITAL ENCOUNTER (OUTPATIENT)
Facility: HOSPITAL | Age: 80
Setting detail: RECURRING SERIES
Discharge: HOME | End: 2025-03-30
Payer: MEDICARE

## 2025-03-27 PROCEDURE — G0237 THERAPEUTIC PROCD STRG ENDUR: HCPCS

## 2025-03-27 NOTE — PROGRESS NOTES
Pulmonary/RT Note    Visit #        23/36       Katie Jordan is a 80 y.o. female presented today for pulmonary rehab. He states that there have been no changes in medication or health since the last visit.          He has a target heart rate of 104-119. She tolerated the exercise session well and has a pain level of 5. Patient states RPE for session today was 6 and RPD was a 6. Today the pt did the following.    Bike:15 min  Wallking: 15 min  Breathing retraining:15 min  Recovery and Monitoring:15 min             Physician available for emergencies: Dr. Génesis SHORT, RT 3/27/2025 2:12 PM

## 2025-04-03 ENCOUNTER — HOSPITAL ENCOUNTER (OUTPATIENT)
Facility: HOSPITAL | Age: 80
Setting detail: RECURRING SERIES
Discharge: HOME | End: 2025-04-06
Payer: MEDICARE

## 2025-04-03 PROCEDURE — G0239 OTH RESP PROC, GROUP: HCPCS

## 2025-04-03 PROCEDURE — G0237 THERAPEUTIC PROCD STRG ENDUR: HCPCS

## 2025-04-03 NOTE — PROGRESS NOTES
Pulmonary/RT Note    Visit #        24/36       Katie Jordan is a 80 y.o. female presented today for pulmonary rehab. She states that there have been no changes in medication or health since the last visit.          She has a target heart rate of 104-119. She tolerated the exercise session well and has a pain level of 6. Patient states RPE for session today was 5 and RPD was a 2. Today the pt did the following.    Arm bike: 15 min  Wallking: 10 min  Resistance bands/Weights:  min   Breathing retraining:15 min  Recovery and Monitoring:15 min             Physician available for emergencies:Mykel SHORT, RT 4/3/2025 3:03 PM

## 2025-04-07 ENCOUNTER — HOSPITAL ENCOUNTER (OUTPATIENT)
Facility: HOSPITAL | Age: 80
Setting detail: INFUSION SERIES
Discharge: HOME | End: 2025-04-07
Payer: MEDICARE

## 2025-04-07 VITALS
RESPIRATION RATE: 18 BRPM | TEMPERATURE: 97.5 F | DIASTOLIC BLOOD PRESSURE: 58 MMHG | HEART RATE: 80 BPM | SYSTOLIC BLOOD PRESSURE: 122 MMHG | OXYGEN SATURATION: 94 %

## 2025-04-07 DIAGNOSIS — J45.50 SEVERE PERSISTENT ASTHMA, UNSPECIFIED WHETHER COMPLICATED (HCC): Primary | ICD-10-CM

## 2025-04-07 PROCEDURE — 96372 THER/PROPH/DIAG INJ SC/IM: CPT

## 2025-04-07 PROCEDURE — 6360000002 HC RX W HCPCS: Performed by: INTERNAL MEDICINE

## 2025-04-07 PROCEDURE — 2500000003 HC RX 250 WO HCPCS: Performed by: INTERNAL MEDICINE

## 2025-04-07 RX ORDER — DIPHENHYDRAMINE HYDROCHLORIDE 50 MG/ML
50 INJECTION, SOLUTION INTRAMUSCULAR; INTRAVENOUS
OUTPATIENT
Start: 2025-05-05

## 2025-04-07 RX ORDER — ACETAMINOPHEN 325 MG/1
650 TABLET ORAL
OUTPATIENT
Start: 2025-05-05

## 2025-04-07 RX ORDER — ONDANSETRON 2 MG/ML
8 INJECTION INTRAMUSCULAR; INTRAVENOUS
OUTPATIENT
Start: 2025-05-05

## 2025-04-07 RX ORDER — EPINEPHRINE 1 MG/ML
0.3 INJECTION, SOLUTION INTRAMUSCULAR; SUBCUTANEOUS PRN
OUTPATIENT
Start: 2025-05-05

## 2025-04-07 RX ORDER — HYDROCORTISONE SODIUM SUCCINATE 100 MG/2ML
100 INJECTION INTRAMUSCULAR; INTRAVENOUS
OUTPATIENT
Start: 2025-05-05

## 2025-04-07 RX ORDER — SODIUM CHLORIDE 9 MG/ML
INJECTION, SOLUTION INTRAVENOUS CONTINUOUS
OUTPATIENT
Start: 2025-05-05

## 2025-04-07 RX ORDER — ALBUTEROL SULFATE 90 UG/1
4 INHALANT RESPIRATORY (INHALATION) PRN
OUTPATIENT
Start: 2025-05-05

## 2025-04-07 RX ADMIN — WATER 100 MG: 1 INJECTION INTRAMUSCULAR; INTRAVENOUS; SUBCUTANEOUS at 14:42

## 2025-04-07 ASSESSMENT — PAIN DESCRIPTION - DESCRIPTORS: DESCRIPTORS: OTHER (COMMENT)

## 2025-04-07 ASSESSMENT — PAIN DESCRIPTION - ORIENTATION: ORIENTATION: RIGHT;LEFT

## 2025-04-07 ASSESSMENT — PAIN SCALES - GENERAL: PAINLEVEL_OUTOF10: 6

## 2025-04-07 NOTE — PROGRESS NOTES
Name: Katie Jordan    MRN: [id number]         : 1945    Nucala      Ms. Jordan arrived to [facility] at 1425 via wheelchair.    Ms. Jordan was assessed and education was provided. She denied any complications from her previous Nucala injection.     Ms. Jordan's vitals were reviewed.  Vitals:    25 1436   BP: (!) 122/58   Pulse: 80   Resp: 18   Temp: 97.5 °F (36.4 °C)   SpO2: 94%     Nucala 100 mg was administered as ordered SQ in patient's LLQ abdomen. Band aid applied.      Ms. Jordan tolerated the injection well, and she had no complaints.     Patient remained for approximately 25 minutes for observation per her request post injection. She had no complaints of any allergic reaction or any other complaints.      Pt's armband removed & shredded.    [facility] at 1510.      Felisha Campos RN  2025

## 2025-04-09 ENCOUNTER — HOSPITAL ENCOUNTER (OUTPATIENT)
Facility: HOSPITAL | Age: 80
Setting detail: RECURRING SERIES
Discharge: HOME | End: 2025-04-12
Payer: MEDICARE

## 2025-04-09 PROCEDURE — G0237 THERAPEUTIC PROCD STRG ENDUR: HCPCS

## 2025-04-09 NOTE — PROGRESS NOTES
Pulmonary/RT Note    Visit #        12/36       Katie Jordan is a 80 y.o. female presented today for pulmonary rehab. She states that there have been no changes in medication or health since the last visit.          She has a target heart rate of . She tolerated the exercise session well and has a pain level of 0. Patient states RPE for session today was 4 and RPD was a 3. Today the pt did the following.    Arm bike: 15 min  Wallking:15 min  Breathing retraining:15 min  Recovery and Monitoring:15 min             Physician available for emergencies: Travis SHORT, RT 4/9/2025 3:51 PM

## 2025-04-10 VITALS — OXYGEN SATURATION: 96 %

## 2025-04-10 ASSESSMENT — PULMONARY FUNCTION TESTS
FEV1/FVC: 80
FEV1 (%PREDICTED): 91
FVC (LITERS): 83

## 2025-04-10 ASSESSMENT — EXERCISE STRESS TEST
PEAK_BP: 151/86
PEAK_HR: 493
PEAK_METS: 1.7
PEAK_RPE: 4

## 2025-04-10 ASSESSMENT — EJECTION FRACTION: EF_VALUE: 75

## 2025-04-10 ASSESSMENT — LIFESTYLE VARIABLES: ALCOHOL_USE: YES

## 2025-04-10 NOTE — PROGRESS NOTES
Katie Jordan #[id number] (CSN:[id number]) (80 y.o. F) (Adm: 04/09/25)  [facility]  Cardiac ITP    Flowsheet Row CARDIO PULMONARY REHAB from 4/9/2025 in [facility] PULMONARY REHAB  Most recent reading at 4/10/2025  1:22 PM   Treatment Diagnosis    Treatment Diagnosis 1 Asthma   Treatment Diagnosis 2 --   Referral Date 11/01/24   Significant Cardiovascular History Previous myocardial infarction, Previous PCI   Co-morbidities Auto-immune disease, Previous MI, Pulmonary disease   Oxygen Saturation / Titration    Stages of Change Action   Oxygen Assessment    Stages of Change Action   Oxygen Type --   Oxygen Compliant --   O2 Flow Rate (l/min) - Rest --   O2 Flow Rate (l/min) - Exercise --   O2 Flow Rate (l/min) - Sleep --   Breath Sounds --   O2 Sat Greater Than 90% --   Retired, Read Only Patients Liter Flow --   Individual Treatment Plan    ITP Visit Type Re-assessment   1st Date of Exercise 11/25/24   ITP Next Review Date 05/08/25   Visit #/Total Visits 25/36   EF% 75 %   FVC (Liters) 83 liters   FEV1 (%PRED) 91   FEV1/FVC 80   DLCO (mL/mmHg sec) --   Risk Stratification Moderate   Pulmonary ITP Exercise, Psychosocial, Nutrition, Education   Exercise    Ribeiro Total Score --   Stages of Change Action   Exercise Test Six minute walk test   Distance Calculated in ft   Distance (Feet-Actual) --   Distance (miles) --   Distance (Feet-Calculated) --   Distance (meters) --   Peak    Peak /86   Peak RPE 4   Peak Mets 1.7   SpO2 --   O2 Flow Rate (l/min) --   Stops 1   SPO2 Range 91-97   DASI Total Score --   BMI (Calculated) --   FEV1 (%PRED) 91   FANNIE Total Score --   Exercise Prescription    Mode Track, Bike, Stepper, Ergometer, Resistance training   Frequency per week 2   Duration Per Session 60   Intensity METS       1.7   Progression slow   SpO2 96 %   O2 Device Room air   O2 Flow Rate (l/min) --   Retired, Read Only FIO2 (%) --   O2 Temperature --   Comments --   Symptoms with Exercise Shortness of breath, Leg

## 2025-04-14 ENCOUNTER — HOSPITAL ENCOUNTER (OUTPATIENT)
Facility: HOSPITAL | Age: 80
Setting detail: RECURRING SERIES
Discharge: HOME | End: 2025-04-17
Payer: MEDICARE

## 2025-04-14 PROCEDURE — G0237 THERAPEUTIC PROCD STRG ENDUR: HCPCS

## 2025-04-14 NOTE — PROGRESS NOTES
Pulmonary/RT Note    Visit #        26/36       Katie Jordan is a 80 y.o. female presented today for pulmonary rehab. She states that there have been no changes in medication or health since the last visit.          She has a target heart rate of 104-119. She tolerated the exercise session well and has a pain level of 6.5. Patient states RPE for session today was 4 and RPD was a 3. Today the pt did the following.    Arm bike: 15 min  Wallking:  min  Breathing retraining:15 min  Recovery and Monitoring:15 min             Physician available for emergencies: JAMILAH SHORT, RT 4/14/2025 2:26 PM

## 2025-04-18 ENCOUNTER — HOSPITAL ENCOUNTER (OUTPATIENT)
Facility: HOSPITAL | Age: 80
Setting detail: RECURRING SERIES
Discharge: HOME | End: 2025-04-21
Payer: MEDICARE

## 2025-04-18 PROCEDURE — G0237 THERAPEUTIC PROCD STRG ENDUR: HCPCS

## 2025-04-22 ENCOUNTER — HOSPITAL ENCOUNTER (OUTPATIENT)
Facility: HOSPITAL | Age: 80
Setting detail: RECURRING SERIES
Discharge: HOME | End: 2025-04-25
Payer: MEDICARE

## 2025-04-22 PROCEDURE — G0237 THERAPEUTIC PROCD STRG ENDUR: HCPCS

## 2025-04-22 NOTE — PROGRESS NOTES
Pulmonary/RT Note    Visit #        28/36       Katie Jordan is a 80 y.o. female presented today for pulmonary rehab. She states that there have been no changes in medication or health since the last visit.          She has a target heart rate of 104-119. She tolerated the exercise session well and has a pain level of 5. Patient states RPE for session today was 4 and RPD was a 3. Today the pt did the following.    Arm bike: 15 min  Wallking: 15 min  Breathing retraining:15 min  Recovery and Monitoring:15 min             Physician available for emergencies: BIJU SHORT, RT 4/22/2025 3:52 PM

## 2025-04-24 ENCOUNTER — HOSPITAL ENCOUNTER (OUTPATIENT)
Facility: HOSPITAL | Age: 80
Setting detail: RECURRING SERIES
Discharge: HOME | End: 2025-04-27
Payer: MEDICARE

## 2025-04-24 PROCEDURE — G0239 OTH RESP PROC, GROUP: HCPCS

## 2025-04-24 PROCEDURE — G0237 THERAPEUTIC PROCD STRG ENDUR: HCPCS

## 2025-04-24 NOTE — PROGRESS NOTES
Pulmonary/RT Note    Visit #        29/36       Katie Jordan is a 80 y.o. female presented today for pulmonary rehab. She states that there have been no changes in medication or health since the last visit.          She has a target heart rate of 104-119. She tolerated the exercise session well and has a pain level of 5. Patient states RPE for session today was 4 and RPD was a 3. Today the pt did the following.    Arm bike: 15 min  Wallking: 15 min  Breathing retraining:15 min    Recovery and Monitoring:15 min             Physician available for emergencies: BIJU SHORT, RT 4/24/2025 1:54 PM

## 2025-04-28 ENCOUNTER — APPOINTMENT (OUTPATIENT)
Facility: HOSPITAL | Age: 80
End: 2025-04-28
Payer: MEDICARE

## 2025-04-30 ENCOUNTER — APPOINTMENT (OUTPATIENT)
Facility: HOSPITAL | Age: 80
End: 2025-04-30
Payer: MEDICARE

## 2025-05-05 ENCOUNTER — HOSPITAL ENCOUNTER (OUTPATIENT)
Facility: HOSPITAL | Age: 80
Setting detail: INFUSION SERIES
Discharge: HOME | End: 2025-05-05
Payer: MEDICARE

## 2025-05-05 VITALS
OXYGEN SATURATION: 97 % | HEART RATE: 80 BPM | DIASTOLIC BLOOD PRESSURE: 62 MMHG | TEMPERATURE: 97.6 F | SYSTOLIC BLOOD PRESSURE: 120 MMHG | RESPIRATION RATE: 18 BRPM

## 2025-05-05 DIAGNOSIS — J45.50 SEVERE PERSISTENT ASTHMA, UNSPECIFIED WHETHER COMPLICATED (HCC): Primary | ICD-10-CM

## 2025-05-05 PROCEDURE — 2500000003 HC RX 250 WO HCPCS: Performed by: INTERNAL MEDICINE

## 2025-05-05 PROCEDURE — 6360000002 HC RX W HCPCS: Performed by: INTERNAL MEDICINE

## 2025-05-05 PROCEDURE — 96372 THER/PROPH/DIAG INJ SC/IM: CPT

## 2025-05-05 RX ORDER — HYDROCORTISONE SODIUM SUCCINATE 100 MG/2ML
100 INJECTION INTRAMUSCULAR; INTRAVENOUS
OUTPATIENT
Start: 2025-06-02

## 2025-05-05 RX ORDER — ONDANSETRON 2 MG/ML
8 INJECTION INTRAMUSCULAR; INTRAVENOUS
OUTPATIENT
Start: 2025-06-02

## 2025-05-05 RX ORDER — DIPHENHYDRAMINE HYDROCHLORIDE 50 MG/ML
50 INJECTION, SOLUTION INTRAMUSCULAR; INTRAVENOUS
OUTPATIENT
Start: 2025-06-02

## 2025-05-05 RX ORDER — EPINEPHRINE 1 MG/ML
0.3 INJECTION, SOLUTION INTRAMUSCULAR; SUBCUTANEOUS PRN
OUTPATIENT
Start: 2025-06-02

## 2025-05-05 RX ORDER — ALBUTEROL SULFATE 90 UG/1
4 INHALANT RESPIRATORY (INHALATION) PRN
OUTPATIENT
Start: 2025-06-02

## 2025-05-05 RX ORDER — ACETAMINOPHEN 325 MG/1
650 TABLET ORAL
OUTPATIENT
Start: 2025-06-02

## 2025-05-05 RX ORDER — AZITHROMYCIN 250 MG/1
TABLET, FILM COATED ORAL
COMMUNITY
Start: 2025-05-02

## 2025-05-05 RX ORDER — SODIUM CHLORIDE 9 MG/ML
INJECTION, SOLUTION INTRAVENOUS CONTINUOUS
OUTPATIENT
Start: 2025-06-02

## 2025-05-05 RX ADMIN — WATER 100 MG: 1 INJECTION INTRAMUSCULAR; INTRAVENOUS; SUBCUTANEOUS at 14:40

## 2025-05-05 ASSESSMENT — PAIN DESCRIPTION - ORIENTATION: ORIENTATION: RIGHT;LEFT

## 2025-05-05 ASSESSMENT — PAIN DESCRIPTION - PAIN TYPE: TYPE: SURGICAL PAIN

## 2025-05-05 ASSESSMENT — PAIN SCALES - GENERAL: PAINLEVEL_OUTOF10: 4

## 2025-05-05 ASSESSMENT — PAIN DESCRIPTION - LOCATION: LOCATION: KNEE

## 2025-05-05 NOTE — PROGRESS NOTES
Name: Katie Jordan    MRN: [id number]         : 1945    NUCALA      Ms. Jordan arrived to [facility] at 1415, ambulatory.    Ms. Jordan was assessed and education was provided.   She denied any complications from her previous Nucala injection.   Ms. Jordan's vitals were reviewed.  Vitals:    25 1431   BP: 120/62   Pulse: 80   Resp: 18   Temp: 97.6 °F (36.4 °C)   SpO2: 97%       Nucala 100 mg was administered as ordered SQ in patient's upper right arm. Band aid applied.      Ms. Jordan tolerated the injection well, and she had no complaints.     Patient remained for approximately 30 minutes for observation per her request post injection. She had no complaints of any allergic reaction or any other complaints.      Pt's armband removed & shredded.    [facility] condition at 1511.  She is to return on 25 at 1400 for her next Nucala appointment.    Felisha Campos RN  May 5, 2025

## 2025-05-06 ENCOUNTER — APPOINTMENT (OUTPATIENT)
Facility: HOSPITAL | Age: 80
End: 2025-05-06
Payer: MEDICARE

## 2025-05-09 ENCOUNTER — HOSPITAL ENCOUNTER (OUTPATIENT)
Facility: HOSPITAL | Age: 80
Setting detail: RECURRING SERIES
Discharge: HOME | End: 2025-05-12
Payer: MEDICARE

## 2025-05-09 PROCEDURE — G0237 THERAPEUTIC PROCD STRG ENDUR: HCPCS

## 2025-05-09 NOTE — PROGRESS NOTES
Pulmonary/RT Note    Visit #               Katie Jordan is a 80 y.o. female presented today for pulmonary rehab. She states that there have been no changes in medication or health since the last visit.          She has a target heart rate of 104-119. He tolerated the exercise session well and has a pain level of 6. Patient states RPE for session today was 4 and RPD was a 3. Today the pt did the following.    Arm bike:8 min  [place] min  Breathing retraining:15 min  Recovery and Monitoring:15 min             Physician available for emergencies: Travis SHORT, RT 2025 2:05 PM

## 2025-05-13 ENCOUNTER — APPOINTMENT (OUTPATIENT)
Facility: HOSPITAL | Age: 80
End: 2025-05-13
Payer: MEDICARE

## 2025-05-14 ENCOUNTER — HOSPITAL ENCOUNTER (OUTPATIENT)
Facility: HOSPITAL | Age: 80
Setting detail: RECURRING SERIES
Discharge: HOME | End: 2025-05-17
Payer: MEDICARE

## 2025-05-14 PROCEDURE — G0237 THERAPEUTIC PROCD STRG ENDUR: HCPCS

## 2025-05-14 NOTE — PROGRESS NOTES
Pulmonary/RT Note    Visit #        31/36       Katie Jordan is a 80 y.o. female presented today for pulmonary rehab. She states that there have been no changes in medication or health since the last visit.          She has a target heart rate of 104-119. She tolerated the exercise session well and has a pain level of 0. Patient states RPE for session today was 4 and RPD was a 3. Today the pt did the following.    Arm bike: 15 min  Wallking: 15 min  Breathing retraining:15 min  Recovery and Monitoring:15 min             Physician available for emergencies: Seb SHORT, RT 5/14/2025 2:57 PM

## 2025-05-16 ENCOUNTER — APPOINTMENT (OUTPATIENT)
Facility: HOSPITAL | Age: 80
End: 2025-05-16
Payer: MEDICARE

## 2025-05-19 ENCOUNTER — HOSPITAL ENCOUNTER (OUTPATIENT)
Facility: HOSPITAL | Age: 80
Setting detail: RECURRING SERIES
Discharge: HOME | End: 2025-05-22
Payer: MEDICARE

## 2025-05-19 ENCOUNTER — APPOINTMENT (OUTPATIENT)
Facility: HOSPITAL | Age: 80
End: 2025-05-19
Payer: MEDICARE

## 2025-05-19 VITALS
HEART RATE: 98 BPM | TEMPERATURE: 98.4 F | OXYGEN SATURATION: 94 % | SYSTOLIC BLOOD PRESSURE: 137 MMHG | DIASTOLIC BLOOD PRESSURE: 80 MMHG | RESPIRATION RATE: 18 BRPM

## 2025-05-19 PROCEDURE — G0237 THERAPEUTIC PROCD STRG ENDUR: HCPCS

## 2025-05-19 PROCEDURE — G0239 OTH RESP PROC, GROUP: HCPCS

## 2025-05-19 NOTE — PROGRESS NOTES
Pulmonary/RT Note    Visit #        32/36       Katie Jordan is a 80 y.o. female presented today for pulmonary rehab. She states that there have been no changes in medication or health since the last visit.          She has a target heart rate of 104-119. She tolerated the exercise session well and has a pain level of 5. Patient states RPE for session today was 6 and RPD was a 5. Today the pt did the following.    Arm bike: 15 min  Wallking: 15 min  Breathing retraining:15 min  Recovery and Monitoring:15 min             Physician available for emergencies: JAMILAH SHORT, RT 5/19/2025 11:31 AM

## 2025-05-21 ENCOUNTER — APPOINTMENT (OUTPATIENT)
Facility: HOSPITAL | Age: 80
End: 2025-05-21
Payer: MEDICARE

## 2025-05-23 ENCOUNTER — APPOINTMENT (OUTPATIENT)
Facility: HOSPITAL | Age: 80
End: 2025-05-23
Payer: MEDICARE

## 2025-05-23 ENCOUNTER — HOSPITAL ENCOUNTER (OUTPATIENT)
Facility: HOSPITAL | Age: 80
Setting detail: RECURRING SERIES
Discharge: HOME | End: 2025-05-26
Payer: MEDICARE

## 2025-05-23 PROCEDURE — G0237 THERAPEUTIC PROCD STRG ENDUR: HCPCS

## 2025-05-23 NOTE — PROGRESS NOTES
Pulmonary/RT Note    Visit #        33/36       Katie Jordan is a 80 y.o. female presented today for pulmonary rehab. She states that there have been no changes in medication or health since the last visit.          She has a target heart rate of 104-119. She tolerated the exercise session well and has a pain level of 5. Patient states RPE for session today was 4 and RPD was a 3. Today the pt did the following.    Arm bike: 15 min  Wallking: 15 min  Breathing retraining:15 min  Recovery and Monitoring:15 min             Physician available for emergencies: JAMILAH SHORT, RT 5/23/2025 2:20 PM

## 2025-06-02 ENCOUNTER — HOSPITAL ENCOUNTER (OUTPATIENT)
Facility: HOSPITAL | Age: 80
Setting detail: RECURRING SERIES
End: 2025-06-02
Payer: MEDICARE

## 2025-06-03 ENCOUNTER — HOSPITAL ENCOUNTER (OUTPATIENT)
Facility: HOSPITAL | Age: 80
Setting detail: RECURRING SERIES
Discharge: HOME | End: 2025-06-06
Payer: MEDICARE

## 2025-06-03 PROCEDURE — G0237 THERAPEUTIC PROCD STRG ENDUR: HCPCS

## 2025-06-03 NOTE — PROGRESS NOTES
Pulmonary/RT Note    Visit #        34/36       Katie Jordan is a 80 y.o. female presented today for pulmonary rehab. She states that there have been no changes in medication or health since the last visit.          She has a target heart rate of 104-119. She tolerated the exercise session well and has a pain level of 6. Patient states RPE for session today was 6 and RPD was a 6. Today the pt did the following.    Arm bike: 15 min  Wallking: 15 min  Breathing retraining:15 min  Recovery and Monitoring:15 min             Physician available for emergencies:     Travis SHORT, RT 6/3/2025 1:54 PM

## 2025-06-05 ENCOUNTER — HOSPITAL ENCOUNTER (OUTPATIENT)
Facility: HOSPITAL | Age: 80
Setting detail: RECURRING SERIES
Discharge: HOME | End: 2025-06-08
Payer: MEDICARE

## 2025-06-05 ENCOUNTER — HOSPITAL ENCOUNTER (OUTPATIENT)
Facility: HOSPITAL | Age: 80
Setting detail: INFUSION SERIES
Discharge: HOME | End: 2025-06-05
Payer: MEDICARE

## 2025-06-05 VITALS
TEMPERATURE: 97.9 F | HEART RATE: 74 BPM | RESPIRATION RATE: 18 BRPM | SYSTOLIC BLOOD PRESSURE: 126 MMHG | DIASTOLIC BLOOD PRESSURE: 74 MMHG | OXYGEN SATURATION: 96 %

## 2025-06-05 DIAGNOSIS — J45.50 SEVERE PERSISTENT ASTHMA, UNSPECIFIED WHETHER COMPLICATED (HCC): Primary | ICD-10-CM

## 2025-06-05 PROCEDURE — 6360000002 HC RX W HCPCS: Performed by: INTERNAL MEDICINE

## 2025-06-05 PROCEDURE — G0237 THERAPEUTIC PROCD STRG ENDUR: HCPCS

## 2025-06-05 PROCEDURE — 2500000003 HC RX 250 WO HCPCS: Performed by: INTERNAL MEDICINE

## 2025-06-05 PROCEDURE — 96372 THER/PROPH/DIAG INJ SC/IM: CPT

## 2025-06-05 RX ORDER — DIPHENHYDRAMINE HYDROCHLORIDE 50 MG/ML
50 INJECTION, SOLUTION INTRAMUSCULAR; INTRAVENOUS
OUTPATIENT
Start: 2025-07-03

## 2025-06-05 RX ORDER — ACETAMINOPHEN 325 MG/1
650 TABLET ORAL
OUTPATIENT
Start: 2025-07-03

## 2025-06-05 RX ORDER — SODIUM CHLORIDE 9 MG/ML
INJECTION, SOLUTION INTRAVENOUS CONTINUOUS
OUTPATIENT
Start: 2025-07-03

## 2025-06-05 RX ORDER — HYDROCORTISONE SODIUM SUCCINATE 100 MG/2ML
100 INJECTION INTRAMUSCULAR; INTRAVENOUS
OUTPATIENT
Start: 2025-07-03

## 2025-06-05 RX ORDER — EPINEPHRINE 1 MG/ML
0.3 INJECTION, SOLUTION INTRAMUSCULAR; SUBCUTANEOUS PRN
OUTPATIENT
Start: 2025-07-03

## 2025-06-05 RX ORDER — ONDANSETRON 2 MG/ML
8 INJECTION INTRAMUSCULAR; INTRAVENOUS
OUTPATIENT
Start: 2025-07-03

## 2025-06-05 RX ORDER — ALBUTEROL SULFATE 90 UG/1
4 INHALANT RESPIRATORY (INHALATION) PRN
OUTPATIENT
Start: 2025-07-03

## 2025-06-05 RX ADMIN — WATER 100 MG: 1 INJECTION INTRAMUSCULAR; INTRAVENOUS; SUBCUTANEOUS at 14:28

## 2025-06-05 ASSESSMENT — PAIN DESCRIPTION - LOCATION: LOCATION: GENERALIZED

## 2025-06-05 ASSESSMENT — PAIN SCALES - GENERAL: PAINLEVEL_OUTOF10: 7

## 2025-06-05 NOTE — PROGRESS NOTES
Name: Katie Jordan    MRN: [id number]         : 1945    Nucala      Ms. Jordan arrived to [facility] at 1415 via wheelchair related to spinal stenosis     Ms. Jordan was assessed and education was provided. She denied any complications from her previous Nucala injection.     Ms. Jordan's vitals were reviewed.  Vitals:    25 1400   BP: 126/74   Pulse: 74   Resp: 18   Temp: 97.9 °F (36.6 °C)   SpO2: 96%     Nucala 100 mg was administered as ordered SQ in patient's right upper arm  Band aid applied.      Ms. Jordan tolerated the injection well, and she had no complaints.     Patient remained for approximately 30 minutes for observation per her request post injection. She had no complaints of any allergic reaction or any other complaints.      Pt's armband removed & shredded.    [facility] at 1455, pt is scheduled to return  2025 @ 1400 pm for Nucala       STEPHY RED RN  2025

## 2025-06-05 NOTE — PROGRESS NOTES
Pulmonary/RT Note    Visit #        35/36       Katie Jordan is a 80 y.o. female presented today for pulmonary rehab. She states that there have been no changes in medication or health since the last visit.          She has a target heart rate of 104-119. She tolerated the exercise session well and has a pain level of 7. Patient states RPE for session today was 5 and RPD was a 5. Today the pt did the following.    Arm bike: 15 min  Wallking: 10 min  Breathing retraining:15 min  Recovery and Monitoring:15 min             Physician available for emergencies:Travis SHORT, RT 6/5/2025 1:19 PM

## 2025-06-09 ENCOUNTER — HOSPITAL ENCOUNTER (OUTPATIENT)
Facility: HOSPITAL | Age: 80
Setting detail: RECURRING SERIES
Discharge: HOME | End: 2025-06-12
Payer: MEDICARE

## 2025-06-09 ASSESSMENT — PATIENT HEALTH QUESTIONNAIRE - PHQ9
8. MOVING OR SPEAKING SO SLOWLY THAT OTHER PEOPLE COULD HAVE NOTICED. OR THE OPPOSITE, BEING SO FIGETY OR RESTLESS THAT YOU HAVE BEEN MOVING AROUND A LOT MORE THAN USUAL: NOT AT ALL
2. FEELING DOWN, DEPRESSED OR HOPELESS: SEVERAL DAYS
SUM OF ALL RESPONSES TO PHQ QUESTIONS 1-9: 4
SUM OF ALL RESPONSES TO PHQ QUESTIONS 1-9: 4
4. FEELING TIRED OR HAVING LITTLE ENERGY: MORE THAN HALF THE DAYS
5. POOR APPETITE OR OVEREATING: NOT AT ALL
10. IF YOU CHECKED OFF ANY PROBLEMS, HOW DIFFICULT HAVE THESE PROBLEMS MADE IT FOR YOU TO DO YOUR WORK, TAKE CARE OF THINGS AT HOME, OR GET ALONG WITH OTHER PEOPLE: NOT DIFFICULT AT ALL
3. TROUBLE FALLING OR STAYING ASLEEP: SEVERAL DAYS
SUM OF ALL RESPONSES TO PHQ QUESTIONS 1-9: 4
9. THOUGHTS THAT YOU WOULD BE BETTER OFF DEAD, OR OF HURTING YOURSELF: NOT AT ALL
SUM OF ALL RESPONSES TO PHQ QUESTIONS 1-9: 4
1. LITTLE INTEREST OR PLEASURE IN DOING THINGS: NOT AT ALL
7. TROUBLE CONCENTRATING ON THINGS, SUCH AS READING THE NEWSPAPER OR WATCHING TELEVISION: NOT AT ALL
6. FEELING BAD ABOUT YOURSELF - OR THAT YOU ARE A FAILURE OR HAVE LET YOURSELF OR YOUR FAMILY DOWN: NOT AT ALL

## 2025-06-09 ASSESSMENT — PULMONARY FUNCTION TESTS
FEV1/FVC: 80
FEV1 (%PREDICTED): 91
FVC (LITERS): 83

## 2025-06-09 ASSESSMENT — LIFESTYLE VARIABLES: ALCOHOL_USE: YES

## 2025-06-09 ASSESSMENT — EXERCISE STRESS TEST
PEAK_BP: 151/86
PEAK_RPE: 4
PEAK_HR: 493
PEAK_METS: 1.7

## 2025-06-09 ASSESSMENT — EJECTION FRACTION: EF_VALUE: 75

## 2025-06-09 NOTE — PROGRESS NOTES
Symptomatic   chronic as evidenced by ferritin and iron panel in July 2018 likely secondary to NSAID overuse for rheumatoid arthritis  Concern for hemorrhagic gastritis vs PUD  Appropriate improvement w/transfusion of 1 IU PRBC  Folic acid level normal while on methotrexate, EGD demonstrates small schatzki ring and small hiatal hernia no gastritis or ulcer or s/sx of bleeding  Pt appetite okay, no pain, and anxious to go home  D/w gastroenterology  Repeat cbc and platelet w/pcp on 3/13/60  Rx provided for fax results to pcp  GI to call and schedule for office visit for colonoscopy  Stressed need for nonulcerogenic diet and need for colonoscopy      Increase FeSO4 to 3x/daily ac using a valid screening tool, Demonstrates appropriate interaction with others, Maximizes coping skills, Positive support group, Engages in self-care behaviors   Patient Treatment Goal --   Goal Status Met   Progress Towards Goal --   Patient Treatment Goal (New) --   Goal Status (New) --   Progress Towards Goal (New) --   Other Core Component - Tobacco Cessation    History of Tobacco/E-Cigarette/Chewing Tobacco/Other --   Current/Recent Use <6 mo. - Managed as Core Component --   Tobacco Cessation Assessment    Stages of Change Other (comment)  [quit 30+ years ago]   Type --   Tobacco Use Status --   Quit --   Date Started --   Date Quit --   Quit Date Set --   # Cigarette Smoked/Day --   Smokeless Tobacco Use --   Amount --   Tobacco Cessation Intervention    Smoking Cessation Referral --   Tobacco Adjunct Therapy --   Tobacco Cessation Education    Resource Information Provided --   Tobacco Triggers --   Tobacco Cessation Goals    Patient Target Goals --   Patient Treatment Goal --   Goal Status --   Progress Towards Goal --   Patient Treatment Goal (New) --   Goal Status (New) --   Progress Towards Goal (New) --   Nutrition Assessment    Stages of Change Action   Nutrition Assessment Tool Rate Your Plate   Rate Your Plate Total Score 52   Current Diet --   Barriers to Heart Healthy Diet --   Alcohol Yes   Type Wine   Amount (Drinks Per Week) 0   Height 1.676 m (5' 6\")   Weight 112.9 kg (249 lb)   BMI 40.27   Waist Circumference (In) --   % Body Fat --   Weight Management    % Body Fat Loss/Gain --   Current Weight Loss/Gain Strategies --   Barriers to Weight Loss/Gain --   Weight Management Referral --   Glucose    HgbA1c No recent results   HgbA1c Date --  [No recent results]   Lipids    Date Lipids Drawn --  [no recent results]   Total n/a   HDL n/a   LDL n/a   Triglycerides n/a   Lipid Med(s) --   Lipid Med Change(s) --   Nutrition Intervention    Dietitian Consult No   Dietitian Consult Date --

## 2025-07-08 ENCOUNTER — HOSPITAL ENCOUNTER (OUTPATIENT)
Facility: HOSPITAL | Age: 80
Setting detail: INFUSION SERIES
Discharge: HOME | End: 2025-07-08
Payer: MEDICARE

## 2025-07-08 VITALS
DIASTOLIC BLOOD PRESSURE: 60 MMHG | TEMPERATURE: 97.5 F | RESPIRATION RATE: 18 BRPM | OXYGEN SATURATION: 95 % | HEART RATE: 78 BPM | SYSTOLIC BLOOD PRESSURE: 118 MMHG

## 2025-07-08 DIAGNOSIS — J45.50 SEVERE PERSISTENT ASTHMA, UNSPECIFIED WHETHER COMPLICATED (HCC): Primary | ICD-10-CM

## 2025-07-08 PROCEDURE — 6360000002 HC RX W HCPCS: Performed by: INTERNAL MEDICINE

## 2025-07-08 PROCEDURE — 2500000003 HC RX 250 WO HCPCS: Performed by: INTERNAL MEDICINE

## 2025-07-08 PROCEDURE — 96372 THER/PROPH/DIAG INJ SC/IM: CPT

## 2025-07-08 RX ORDER — ONDANSETRON 2 MG/ML
8 INJECTION INTRAMUSCULAR; INTRAVENOUS
OUTPATIENT
Start: 2025-07-29

## 2025-07-08 RX ORDER — HYDROCORTISONE SODIUM SUCCINATE 100 MG/2ML
100 INJECTION INTRAMUSCULAR; INTRAVENOUS
OUTPATIENT
Start: 2025-07-29

## 2025-07-08 RX ORDER — SODIUM CHLORIDE 9 MG/ML
INJECTION, SOLUTION INTRAVENOUS CONTINUOUS
OUTPATIENT
Start: 2025-07-29

## 2025-07-08 RX ORDER — ALBUTEROL SULFATE 90 UG/1
4 INHALANT RESPIRATORY (INHALATION) PRN
OUTPATIENT
Start: 2025-07-29

## 2025-07-08 RX ORDER — ACETAMINOPHEN 325 MG/1
650 TABLET ORAL
OUTPATIENT
Start: 2025-07-29

## 2025-07-08 RX ORDER — EPINEPHRINE 1 MG/ML
0.3 INJECTION, SOLUTION INTRAMUSCULAR; SUBCUTANEOUS PRN
OUTPATIENT
Start: 2025-07-29

## 2025-07-08 RX ORDER — DIPHENHYDRAMINE HYDROCHLORIDE 50 MG/ML
50 INJECTION, SOLUTION INTRAMUSCULAR; INTRAVENOUS
OUTPATIENT
Start: 2025-07-29

## 2025-07-08 RX ADMIN — WATER 100 MG: 1 INJECTION INTRAMUSCULAR; INTRAVENOUS; SUBCUTANEOUS at 14:34

## 2025-07-08 ASSESSMENT — PAIN SCALES - GENERAL: PAINLEVEL_OUTOF10: 8

## 2025-07-08 ASSESSMENT — PAIN DESCRIPTION - ORIENTATION: ORIENTATION: RIGHT;LEFT

## 2025-07-08 ASSESSMENT — PAIN DESCRIPTION - LOCATION: LOCATION: GENERALIZED

## 2025-07-08 NOTE — PROGRESS NOTES
Name: Katie Jordan    MRN: [id number]         : 1945    Nucala      Ms. Jordan arrived to [facility] at 1418 via wheelchair. Patient used cane to transfer to chair without assistance.     Ms. Jordan was assessed and education was provided. She denied any complications from her previous Nucala injection.     Ms. Jordan's vitals were reviewed.  Vitals:    25 1425   BP: 118/60   Pulse: 78   Resp: 18   Temp: 97.5 °F (36.4 °C)   SpO2: 95%       Nucala 100 mg was administered as ordered SQ in LLQ of patient's abdomen. No bleeding or irritation noted. Band aid applied.      Ms. Jordan tolerated the injection well, and had no complaints.     Patient remained for approximately 30 minutes for observation per her request post injection. She had no complaints of any allergic reaction or any other complaints.      Pt's armband removed & shredded.    [facility] condition at 1510.  She is to return on 25 at 1400 for her next Nucala appointment.    Felisha Campos RN  2025

## 2025-08-05 ENCOUNTER — HOSPITAL ENCOUNTER (OUTPATIENT)
Facility: HOSPITAL | Age: 80
Setting detail: INFUSION SERIES
Discharge: HOME | End: 2025-08-05
Payer: MEDICARE

## 2025-08-05 VITALS
OXYGEN SATURATION: 94 % | DIASTOLIC BLOOD PRESSURE: 60 MMHG | SYSTOLIC BLOOD PRESSURE: 121 MMHG | HEART RATE: 74 BPM | RESPIRATION RATE: 18 BRPM | TEMPERATURE: 97.4 F

## 2025-08-05 DIAGNOSIS — J45.50 SEVERE PERSISTENT ASTHMA, UNSPECIFIED WHETHER COMPLICATED (HCC): Primary | ICD-10-CM

## 2025-08-05 PROCEDURE — 6360000002 HC RX W HCPCS: Performed by: INTERNAL MEDICINE

## 2025-08-05 PROCEDURE — 96372 THER/PROPH/DIAG INJ SC/IM: CPT

## 2025-08-05 PROCEDURE — 2500000003 HC RX 250 WO HCPCS: Performed by: INTERNAL MEDICINE

## 2025-08-05 RX ORDER — SODIUM CHLORIDE 9 MG/ML
INJECTION, SOLUTION INTRAVENOUS CONTINUOUS
OUTPATIENT
Start: 2025-09-02

## 2025-08-05 RX ORDER — DIPHENHYDRAMINE HYDROCHLORIDE 50 MG/ML
50 INJECTION, SOLUTION INTRAMUSCULAR; INTRAVENOUS
OUTPATIENT
Start: 2025-09-02

## 2025-08-05 RX ORDER — ACETAMINOPHEN 325 MG/1
650 TABLET ORAL
OUTPATIENT
Start: 2025-09-02

## 2025-08-05 RX ORDER — EPINEPHRINE 1 MG/ML
0.3 INJECTION, SOLUTION INTRAMUSCULAR; SUBCUTANEOUS PRN
OUTPATIENT
Start: 2025-09-02

## 2025-08-05 RX ORDER — ALBUTEROL SULFATE 90 UG/1
4 INHALANT RESPIRATORY (INHALATION) PRN
OUTPATIENT
Start: 2025-09-02

## 2025-08-05 RX ORDER — ONDANSETRON 2 MG/ML
8 INJECTION INTRAMUSCULAR; INTRAVENOUS
OUTPATIENT
Start: 2025-09-02

## 2025-08-05 RX ORDER — HYDROCORTISONE SODIUM SUCCINATE 100 MG/2ML
100 INJECTION INTRAMUSCULAR; INTRAVENOUS
OUTPATIENT
Start: 2025-09-02

## 2025-08-05 RX ADMIN — WATER 100 MG: 1 INJECTION INTRAMUSCULAR; INTRAVENOUS; SUBCUTANEOUS at 14:39

## 2025-08-05 ASSESSMENT — PAIN DESCRIPTION - LOCATION: LOCATION: GENERALIZED

## 2025-08-05 ASSESSMENT — PAIN DESCRIPTION - PAIN TYPE: TYPE: CHRONIC PAIN

## 2025-08-05 ASSESSMENT — PAIN SCALES - GENERAL: PAINLEVEL_OUTOF10: 6
